# Patient Record
Sex: FEMALE | Race: WHITE | NOT HISPANIC OR LATINO | Employment: FULL TIME | RURAL
[De-identification: names, ages, dates, MRNs, and addresses within clinical notes are randomized per-mention and may not be internally consistent; named-entity substitution may affect disease eponyms.]

---

## 2017-09-21 ENCOUNTER — TRANSCRIBE ORDERS (OUTPATIENT)
Dept: PHYSICAL THERAPY | Facility: HOSPITAL | Age: 42
End: 2017-09-21

## 2017-09-21 DIAGNOSIS — M54.2 NECK PAIN: Primary | ICD-10-CM

## 2017-10-04 ENCOUNTER — HOSPITAL ENCOUNTER (OUTPATIENT)
Dept: PHYSICAL THERAPY | Facility: HOSPITAL | Age: 42
Setting detail: THERAPIES SERIES
Discharge: HOME | End: 2017-10-04

## 2017-10-04 DIAGNOSIS — M54.2 NECK PAIN: Primary | ICD-10-CM

## 2017-10-04 PROCEDURE — 97161 PT EVAL LOW COMPLEX 20 MIN: CPT
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
## 2017-10-11 ENCOUNTER — HOSPITAL ENCOUNTER (OUTPATIENT)
Dept: PHYSICAL THERAPY | Facility: HOSPITAL | Age: 42
Setting detail: THERAPIES SERIES
Discharge: HOME | End: 2017-10-11

## 2017-10-11 DIAGNOSIS — M54.2 NECK PAIN: Primary | ICD-10-CM

## 2017-10-11 PROCEDURE — 97110 THERAPEUTIC EXERCISES: CPT

## 2017-10-11 PROCEDURE — 97140 MANUAL THERAPY 1/> REGIONS: CPT

## 2017-10-11 NOTE — THERAPY TREATMENT NOTE
Outpatient Physical Therapy Ortho Treatment Note  [facility]     Patient Name: Paradise English  : 1975  MRN: [id number]  Today's Date: 10/11/2017      Subjective Improvement:  Attendance:   Approved: 6 approved  MD Follow up:   Date: 10/25/17    Visit Date: 10/11/2017    Visit Dx:    ICD-10-CM ICD-9-CM   1. Neck pain M54.2 723.1       There is no problem list on file for this patient.       Past Medical History:   Diagnosis Date   • Low back pain         Past Surgical History:   Procedure Laterality Date   • HERNIA REPAIR     • LAPAROSCOPIC TUBAL LIGATION     • SPLENECTOMY     • TONSILLECTOMY               PT Ortho       10/11/17 1400    Subjective Comments    Subjective Comments Patient reports she thinks the pain is a little better.   -NH    Precautions and Contraindications    Precautions/Limitations no known precautions/limitations  -NH    Subjective Pain    Able to rate subjective pain? yes  -NH    Pre-Treatment Pain Level 7  -NH      User Key  (r) = Recorded By, (t) = Taken By, (c) = Cosigned By    Initials Name Provider Type    NH Rupali Byers, PT Physical Therapist                            PT Assessment/Plan       10/11/17 1400       PT Assessment    Functional Limitations Limitation in home management;Limitations in community activities;Limitations in functional capacity and performance;Performance in work activities;Performance in self-care ADL  -NH     Impairments Impaired flexibility;Joint mobility;Muscle strength;Pain;Poor body mechanics;Posture;Range of motion  -NH     Assessment Comments Patient has fair tolerance for manual with cues to reduce guarding and holding head up. Patient's reported symptoms do not match mnaual findings. Extreme tenderness thorughout occipitals and cervical paraspinals.   -NH     Rehab Potential Good  -NH     Patient/caregiver participated in establishment of treatment plan and goals Yes  -NH     Patient would benefit from skilled  therapy intervention Yes  -NH     PT Plan    PT Frequency 2x/week  -NH     Predicted Duration of Therapy Intervention (days/wks) 6-8 weeks  -NH     PT Plan Comments Advance postural stability as able including neutral spine training and cervical isometrics.   -NH       User Key  (r) = Recorded By, (t) = Taken By, (c) = Cosigned By    Initials Name Provider Type    NH Rupali Byers, PT Physical Therapist                Modalities       10/11/17 1400          Ice    Ice Applied Yes  -NH      Location C-spine  -NH      Rx Minutes 15 mins  -NH      Ice S/P Rx Yes  -NH        User Key  (r) = Recorded By, (t) = Taken By, (c) = Cosigned By    Initials Name Provider Type    NH Rupali Byers, PT Physical Therapist                Exercises       10/11/17 1400          Subjective Comments    Subjective Comments Patient reports she thinks the pain is a little better.   -NH      Subjective Pain    Able to rate subjective pain? yes  -NH      Pre-Treatment Pain Level 7  -NH      Exercise 1    Exercise Name 1 chin tuck  -NH      Exercise 2    Exercise Name 2 shoulder circles  -NH      Exercise 3    Exercise Name 3 scap retraction  -NH      Exercise 4    Exercise Name 4 UT stretch  -NH      Sets 4 3  -NH      Time (Seconds) 4 20  -NH      Additional Comments gentle  -NH      Exercise 5    Exercise Name 5 LS stretch  -NH      Sets 5 3  -NH      Time (Seconds) 5 20  -NH      Additional Comments gentle  -NH        User Key  (r) = Recorded By, (t) = Taken By, (c) = Cosigned By    Initials Name Provider Type    NH Rupali Byers, PT Physical Therapist                        Manual Rx (last 36 hours)      Manual Treatments       10/11/17 1500          Manual Rx 1    Manual Rx 1 Location Cervical  -NH      Manual Rx 1 Type STM/gentle distraction  -NH      Manual Rx 1 Duration 23'  -NH        User Key  (r) = Recorded By, (t) = Taken By, (c) = Cosigned By    Initials Name Provider Type    NH Rupali Byers, PT Physical Therapist                 PT OP Goals       10/11/17 1400       PT Short Term Goals    STG Date to Achieve 10/25/17  -NH     STG 1 IND and compliant with HEP  -NH     STG 2 Patient will increase cervical flexion to 30 degrees  -NH     STG 3 Patient will increase cervical extension to 25 degrees  -NH     STG 4 Patient will report 50% decrease in HA   -NH     STG 5 Patient will decrese NDI to less than 30%  -NH     Long Term Goals    LTG Date to Achieve 11/15/17  -NH     LTG 1 Patient will be able to tolerate 45 minute treatment session with no greater than 2 point increase in pain on VAS  -NH     LTG 2 Patient will reduce NDI to less than 20%  -NH     LTG 3 Patient will have cervical extension to 35 degrees  -NH     LTG 4 Patient will demonstrate cervical rotation to 50 degrees bilaterally  -NH     LTG 5 Patient will demonstrate 3+/5 B shoulder flexion strength  -NH     LTG 6 Patient will demonstrate 3+/5 B shoudler abd strength  -NH     Time Calculation    PT Goal Re-Cert Due Date 10/25/17  -NH       User Key  (r) = Recorded By, (t) = Taken By, (c) = Cosigned By    Initials Name Provider Type    NH Rupali Byers, PT Physical Therapist                Therapy Education       10/11/17 1400          Therapy Education    Given HEP;Symptoms/condition management;Pain management  -NH      Program Reinforced  -NH      How Provided Verbal;Demonstration;Written  -NH      Provided to Patient  -NH      Level of Understanding Verbalized;Demonstrated  -NH        User Key  (r) = Recorded By, (t) = Taken By, (c) = Cosigned By    Initials Name Provider Type    NH Rupali Byers, PT Physical Therapist                Time Calculation:   Start Time: 1437  Stop Time: 1533  Time Calculation (min): 56 min  Total Timed Code Minutes- PT: 41 minute(s)    Therapy Charges for Today     Code Description Service Date Service Provider Modifiers Qty    32854922200 HC PT MANUAL THERAPY EA 15 MIN 10/11/2017 Rupali Byers, PT GP 2    92227106031 HC PT THER  PROC EA 15 MIN 10/11/2017 Rupali Byers, PT GP 1    [id number]  PT THER SUPP EA 15 MIN 10/11/2017 Rupali Byers, PT GP 1                    Rupali Byers, PT  10/11/2017

## 2017-10-13 ENCOUNTER — HOSPITAL ENCOUNTER (OUTPATIENT)
Dept: PHYSICAL THERAPY | Facility: HOSPITAL | Age: 42
Setting detail: THERAPIES SERIES
End: 2017-10-13

## 2017-10-16 ENCOUNTER — HOSPITAL ENCOUNTER (OUTPATIENT)
Dept: PHYSICAL THERAPY | Facility: HOSPITAL | Age: 42
Setting detail: THERAPIES SERIES
Discharge: HOME | End: 2017-10-16

## 2017-10-16 DIAGNOSIS — M54.2 NECK PAIN: Primary | ICD-10-CM

## 2017-10-16 PROCEDURE — 97110 THERAPEUTIC EXERCISES: CPT

## 2017-10-16 PROCEDURE — 97140 MANUAL THERAPY 1/> REGIONS: CPT

## 2017-10-16 NOTE — THERAPY TREATMENT NOTE
"    Outpatient Physical Therapy Ortho Treatment Note  [facility]  Yue Canela PTA  10/16/17  10:29 AM       Patient Name: Paradise English  : 1975  MRN: [id number]  Today's Date: 10/16/2017      Visit Date: 10/16/2017    Subjective Improvement: \"a little\"       Attendance: 3/4   Approved: eval + 6           MD follow up: TBD           RC date: 10/25/17         Visit Dx:    ICD-10-CM ICD-9-CM   1. Neck pain M54.2 723.1       There is no problem list on file for this patient.       Past Medical History:   Diagnosis Date   • Low back pain         Past Surgical History:   Procedure Laterality Date   • HERNIA REPAIR     • LAPAROSCOPIC TUBAL LIGATION     • SPLENECTOMY     • TONSILLECTOMY               PT Ortho       10/16/17 0900    Precautions and Contraindications    Precautions/Limitations no known precautions/limitations  -    Posture/Observations    Posture/Observations Comments Guarded posture. Fwd rounded shoulders  -      User Key  (r) = Recorded By, (t) = Taken By, (c) = Cosigned By    Initials Name Provider Type     Yue Canela PTA Physical Therapy Assistant                            PT Assessment/Plan       10/16/17 0900       PT Assessment    Functional Limitations Limitation in home management;Limitations in community activities;Limitations in functional capacity and performance;Performance in work activities;Performance in self-care ADL  -     Impairments Impaired flexibility;Joint mobility;Muscle strength;Pain;Poor body mechanics;Posture;Range of motion  -     Assessment Comments pt very TTP to occiptials and cspine paraspinals. Able to tolerate STM/MFR to B UT better than cspine. Needed cues to try to relax neck and shoulders during therex due to pt being in a gaurded postion most of the time.  -     Rehab Potential Good  -     Patient/caregiver participated in establishment of treatment plan and goals Yes  -     Patient would benefit from skilled " therapy intervention Yes  -LASHON     PT Plan    PT Frequency 2x/week  -LASHON     Predicted Duration of Therapy Intervention (days/wks) 6-8 weeks  -LASHON     PT Plan Comments Try IFC next visit. Possible cspine isometrics  -LASHON       User Key  (r) = Recorded By, (t) = Taken By, (c) = Cosigned By    Initials Name Provider Type    LASHON Canela PTA Physical Therapy Assistant                Modalities       10/16/17 0900          Moist Heat    MH Applied Yes  -LASHON      Location Cspine  -LASHON      Rx Minutes 15 mins  -LASHON      MH S/P Rx Yes  -LASHON        User Key  (r) = Recorded By, (t) = Taken By, (c) = Cosigned By    Initials Name Provider Type    LASHON LopesYue G LIZA Canela Physical Therapy Assistant                Exercises       10/16/17 0900          Subjective Comments    Subjective Comments pt reports that she gets some releif from laying down but seems to have a constant HA.  -LASHON      Subjective Pain    Able to rate subjective pain? yes  -LASHON      Pre-Treatment Pain Level 8  -LASHON      Post-Treatment Pain Level 6  -LASHON      Aquatics    Aquatics performed? No  -LASHON      Exercise 1    Exercise Name 1 UT S  -LASHON      Reps 1 2  -LASHON      Time (Seconds) 1 30 sec hold  -LASHON      Exercise 2    Exercise Name 2 Levator S  -LASHON      Reps 2 2  -LASHON      Time (Seconds) 2 30 sec   -LASHON      Exercise 3    Exercise Name 3 Scap squeezes  -LASHON      Sets 3 2  -LASHON      Reps 3 10  -LASHON      Exercise 4    Exercise Name 4 Shoulder rolls bkw  -LASHON      Sets 4 2  -LASHON      Reps 4 10  -LASHON      Exercise 5    Exercise Name 5 No moneys  -LASHON      Sets 5 2  -LASHON      Reps 5 10  -LASHON      Time (Seconds) 5 5 sec hold  -LASHON      Exercise 6    Exercise Name 6 Supine chin tucks  -LASHON      Sets 6 2  -LASHON      Reps 6 10  -LASHON      Time (Seconds) 6 5 sec hold  -LASHON      Exercise 7    Exercise Name 7 Supine cspine AROM rot/ SB  -LASHON      Sets 7 2  -LASHON      Reps 7 10  -LASHON        User Key  (r) = Recorded By, (t) = Taken By, (c) = Cosigned By    Initials Name Provider Type    LASHON  Yue Canela PTA Physical Therapy Assistant                        Manual Rx (last 36 hours)      Manual Treatments       10/16/17 0900          Manual Rx 1    Manual Rx 1 Location Cervical  -LASHON      Manual Rx 1 Type STM/gentle distraction  -LASHON      Manual Rx 1 Duration 15 min  -LASHON        User Key  (r) = Recorded By, (t) = Taken By, (c) = Cosigned By    Initials Name Provider Type    LASHON Canela PTA Physical Therapy Assistant                PT OP Goals       10/16/17 0900       PT Short Term Goals    STG Date to Achieve 10/25/17  -LASHON     STG 1 IND and compliant with HEP  -LASHON     STG 2 Patient will increase cervical flexion to 30 degrees  -LASHON     STG 3 Patient will increase cervical extension to 25 degrees  -LASHON     STG 4 Patient will report 50% decrease in HA   -LASHON     STG 5 Patient will decrese NDI to less than 30%  -     Long Term Goals    LTG Date to Achieve 11/15/17  -LASHON     LTG 1 Patient will be able to tolerate 45 minute treatment session with no greater than 2 point increase in pain on VAS  -     LTG 2 Patient will reduce NDI to less than 20%  -LASHON     LTG 3 Patient will have cervical extension to 35 degrees  -LASHON     LTG 4 Patient will demonstrate cervical rotation to 50 degrees bilaterally  -LASHON     LTG 5 Patient will demonstrate 3+/5 B shoulder flexion strength  -LASHON     LTG 6 Patient will demonstrate 3+/5 B shoudler abd strength  -     Time Calculation    PT Goal Re-Cert Due Date 10/25/17  -LASHON       User Key  (r) = Recorded By, (t) = Taken By, (c) = Cosigned By    Initials Name Provider Type    LASHON Canela PTA Physical Therapy Assistant                Therapy Education       10/16/17 0900          Therapy Education    Given HEP;Symptoms/condition management;Pain management  -LASHON      Program Reinforced  -LASHON      How Provided Verbal;Demonstration;Written  -LASHON      Provided to Patient  -LASHON      Level of Understanding Verbalized;Demonstrated  -LASHON        User Key  (r) = Recorded  By, (t) = Taken By, (c) = Cosigned By    Initials Name Provider Type    LASHON Canela PTA Physical Therapy Assistant                Time Calculation:   Start Time: 0930  Stop Time: 1027  Time Calculation (min): 57 min  Total Timed Code Minutes- PT: 42 minute(s)    Therapy Charges for Today     Code Description Service Date Service Provider Modifiers Qty    75667825905 HC PT THER PROC EA 15 MIN 10/16/2017 Yue Canela PTA GP 2    95461874933 HC PT MANUAL THERAPY EA 15 MIN 10/16/2017 Yue Canela, LIZA GP 1    19519243772 HC PT THER SUPP EA 15 MIN 10/16/2017 Yue Canela, LIZA GP 1                    Yue Canela PTA  10/16/2017

## 2017-10-19 ENCOUNTER — HOSPITAL ENCOUNTER (OUTPATIENT)
Dept: PHYSICAL THERAPY | Facility: HOSPITAL | Age: 42
Setting detail: THERAPIES SERIES
Discharge: HOME | End: 2017-10-19

## 2017-10-19 DIAGNOSIS — M54.2 NECK PAIN: Primary | ICD-10-CM

## 2017-10-19 PROCEDURE — 97110 THERAPEUTIC EXERCISES: CPT

## 2017-10-19 NOTE — THERAPY TREATMENT NOTE
Outpatient Physical Therapy Ortho Treatment Note  [facility]  Chelsea Giron PTA  10/19/17  1:36 PM       Patient Name: Paradise English  : 1975  MRN: [id number]  Today's Date: 10/19/2017      Visit Date: 10/19/2017  Subjective Improvement:    10%   Attendance:   Approved:     eval + 6 10-5/11-4      MD follow up:  TBS ? Monday         RC date:     10/25/2017    Visit Dx:    ICD-10-CM ICD-9-CM   1. Neck pain M54.2 723.1       There is no problem list on file for this patient.       Past Medical History:   Diagnosis Date   • Low back pain         Past Surgical History:   Procedure Laterality Date   • HERNIA REPAIR     • LAPAROSCOPIC TUBAL LIGATION     • SPLENECTOMY     • TONSILLECTOMY               PT Ortho       10/19/17 0800    Subjective Pain    Pre-Treatment Pain Level 8  -PM    Post-Treatment Pain Level 5  -PM      User Key  (r) = Recorded By, (t) = Taken By, (c) = Cosigned By    Initials Name Provider Type    PM Chelsea Giron PTA Physical Therapy Assistant                            PT Assessment/Plan       10/19/17 0900       PT Assessment    Assessment Comments Pt cooperative with PT; guarded with movements; cues needed for excs and posture. Good response today to Rx incl IFC and very gentle manual (less is more for her). Decr pain post treatment.  -PM     Rehab Potential Good  -PM     Patient/caregiver participated in establishment of treatment plan and goals Yes  -PM     Patient would benefit from skilled therapy intervention Yes  -PM     PT Plan    PT Frequency 2x/week  -PM     Predicted Duration of Therapy Intervention (days/wks) 6-8 wks  -PM     PT Plan Comments deep cervical flexors gentle isometric to PTA, supine  -PM       User Key  (r) = Recorded By, (t) = Taken By, (c) = Cosigned By    Initials Name Provider Type    CLARENCE Giron PTA Physical Therapy Assistant                Modalities       10/19/17 0800          Moist Heat    Location Cspine  -PM       Rx Minutes 15 mins  -PM      MH S/P Rx Yes  -PM      ELECTRICAL STIMULATION    Attended/Unattended Unattended  -PM      Stimulation Type IFC  -PM      Location/Electrode Placement/Other cervical; UT  -PM      Rx Minutes 15 mins  -PM        User Key  (r) = Recorded By, (t) = Taken By, (c) = Cosigned By    Initials Name Provider Type    PM Chelsea Giron PTA Physical Therapy Assistant                Exercises       10/19/17 0800          Subjective Comments    Subjective Comments PT helped a little but then migraine HA rest of day. As RE: HEP, shoulder blade squeezes worst b/c goes up neck.  -PM      Subjective Pain    Pre-Treatment Pain Level 8  -PM      Post-Treatment Pain Level 5  -PM      Aquatics    Aquatics performed? No  -PM      Exercise 1    Exercise Name 1 UT S  -PM      Cueing 1 Verbal  -PM      Reps 1 3  -PM      Time (Seconds) 1 20  -PM      Exercise 2    Exercise Name 2 Levator S  -PM      Cueing 2 Verbal  -PM      Reps 2 3  -PM      Time (Seconds) 2 20  -PM      Exercise 3    Exercise Name 3 low trap iso row  -PM      Cueing 3 Tactile  -PM      Sets 3 1  -PM      Reps 3 10  -PM      Time (Seconds) 3 3  -PM      Exercise 4    Exercise Name 4 Shoulder rolls bkw  -PM      Cueing 4 Verbal  -PM      Sets 4 2  -PM      Reps 4 10  -PM      Exercise 5    Exercise Name 5 No moneys  -PM      Cueing 5 Verbal  -PM      Sets 5 2  -PM      Reps 5 8  -PM      Time (Seconds) 5 3  -PM      Exercise 6    Exercise Name 6 Supine chin tucks  -PM      Cueing 6 Tactile  -PM      Sets 6 2  -PM      Reps 6 10  -PM      Time (Seconds) 6 5 sec hold  -PM      Exercise 7    Exercise Name 7 Supine cspine AROM rot/ SB  -PM      Sets 7 1  -PM      Reps 7 15  -PM      Exercise 8    Exercise Name 8 Doorway S   -PM      Cueing 8 Demo  -PM      Reps 8 3  -PM      Time (Seconds) 8 20  -PM        User Key  (r) = Recorded By, (t) = Taken By, (c) = Cosigned By    Initials Name Provider Type    PM Chelsea Giron PTA Physical  Therapy Assistant                        Manual Rx (last 36 hours)      Manual Treatments       10/19/17 0800          Manual Rx 1    Manual Rx 1 Location Cervical sub occip; paraspinal; SCM; UT; LS  -PM      Manual Rx 1 Type gentle STM/MFR; sub occip release very gentle  -PM      Manual Rx 1 Duration 15 min  -PM        User Key  (r) = Recorded By, (t) = Taken By, (c) = Cosigned By    Initials Name Provider Type    PM Chelsea Giron PTA Physical Therapy Assistant                PT OP Goals       10/19/17 0800       PT Short Term Goals    STG Date to Achieve 10/25/17  -PM     STG 1 IND and compliant with HEP  -PM     STG 1 Progress Progressing  -PM     STG 2 Patient will increase cervical flexion to 30 degrees  -PM     STG 2 Progress Ongoing  -PM     STG 3 Patient will increase cervical extension to 25 degrees  -PM     STG 3 Progress Ongoing  -PM     STG 4 Patient will report 50% decrease in HA   -PM     STG 4 Progress Ongoing  -PM     STG 5 Patient will decrese NDI to less than 30%  -PM     STG 5 Progress Ongoing  -PM     Long Term Goals    LTG Date to Achieve 11/15/17  -PM     LTG 1 Patient will be able to tolerate 45 minute treatment session with no greater than 2 point increase in pain on VAS  -PM     LTG 2 Patient will reduce NDI to less than 20%  -PM     LTG 3 Patient will have cervical extension to 35 degrees  -PM     LTG 4 Patient will demonstrate cervical rotation to 50 degrees bilaterally  -PM     LTG 5 Patient will demonstrate 3+/5 B shoulder flexion strength  -PM     LTG 6 Patient will demonstrate 3+/5 B shoudler abd strength  -PM     Time Calculation    PT Goal Re-Cert Due Date 10/25/17  -PM       User Key  (r) = Recorded By, (t) = Taken By, (c) = Cosigned By    Initials Name Provider Type    PM Chelsea Giron PTA Physical Therapy Assistant                Therapy Education       10/19/17 0800          Therapy Education    Given HEP;Symptoms/condition management;Pain management  -PM      Program  Reinforced  -PM      How Provided Verbal;Demonstration;Written  -PM      Provided to Patient  -PM      Level of Understanding Verbalized;Demonstrated  -PM        User Key  (r) = Recorded By, (t) = Taken By, (c) = Cosigned By    Initials Name Provider Type    PM Chelsea Giron PTA Physical Therapy Assistant                Time Calculation:   Start Time: 0850  Stop Time: 0945  Time Calculation (min): 55 min  Total Timed Code Minutes- PT: 40 minute(s)    Therapy Charges for Today     Code Description Service Date Service Provider Modifiers Qty    95549952601 HC PT THER PROC EA 15 MIN 10/19/2017 Chelsea Giron PTA GP 3                    Chelsea Giron PTA  10/19/2017

## 2017-10-23 ENCOUNTER — HOSPITAL ENCOUNTER (OUTPATIENT)
Dept: PHYSICAL THERAPY | Facility: HOSPITAL | Age: 42
Setting detail: THERAPIES SERIES
Discharge: HOME | End: 2017-10-23

## 2017-10-23 DIAGNOSIS — M54.2 NECK PAIN: Primary | ICD-10-CM

## 2017-10-23 PROCEDURE — G0283 ELEC STIM OTHER THAN WOUND: HCPCS

## 2017-10-23 PROCEDURE — 97110 THERAPEUTIC EXERCISES: CPT

## 2017-10-23 PROCEDURE — 97140 MANUAL THERAPY 1/> REGIONS: CPT

## 2017-10-23 NOTE — THERAPY TREATMENT NOTE
"    Outpatient Physical Therapy Ortho Treatment Note  [facility]  Yue Canela PTA  10/23/17  11:52 AM       Patient Name: Paradise English  : 1975  MRN: [id number]  Today's Date: 10/23/2017      Visit Date: 10/23/2017    Subjective Improvement: 10%      Attendance:    Approved:eval + 6           MD follow up: 10/23/17           RC date: 10/25/17         Visit Dx:    ICD-10-CM ICD-9-CM   1. Neck pain M54.2 723.1       There is no problem list on file for this patient.       Past Medical History:   Diagnosis Date   • Low back pain         Past Surgical History:   Procedure Laterality Date   • HERNIA REPAIR     • LAPAROSCOPIC TUBAL LIGATION     • SPLENECTOMY     • TONSILLECTOMY               PT Ortho       10/23/17 1100    Precautions and Contraindications    Precautions/Limitations no known precautions/limitations  -LASHON    Subjective Pain    Able to rate subjective pain? yes  -LASHON    Pre-Treatment Pain Level 8  -LASHON    Post-Treatment Pain Level --   \"better\"  -LASHON    Neck    Flexion AROM Deficit 19  -LASHON    Extension AROM Deficit 11  -LASHON    Lt Lat Flexion AROM Deficit 22  -LASHON    Rt Lateral Flexion AROM Deficit 22  -LASHON    Lt Rotation AROM Deficit 30  -LASHON    Rt Rotation AROM Deficit 33  -LASHON      User Key  (r) = Recorded By, (t) = Taken By, (c) = Cosigned By    Initials Name Provider Type    LASHON Canela PTA Physical Therapy Assistant                            PT Assessment/Plan       10/23/17 1100       PT Assessment    Functional Limitations Limitation in home management;Limitations in community activities;Limitations in functional capacity and performance;Performance in work activities;Performance in self-care ADL  -     Impairments Impaired flexibility;Joint mobility;Muscle strength;Pain;Poor body mechanics;Posture;Range of motion  -     Assessment Comments Pt seemed to be a little more relaxed and less garuded with therex today. Decrease amount on manual time due to " "having to get measurements for an MD note.  -LASHON     Rehab Potential Good  -LASHON     Patient/caregiver participated in establishment of treatment plan and goals Yes  -LASHON     Patient would benefit from skilled therapy intervention Yes  -LASHON     PT Plan    PT Frequency 2x/week  -LASHON     Predicted Duration of Therapy Intervention (days/wks) 6-8 weeks  -LASHON     PT Plan Comments MD note sent. Gentle cspine isometrics in supine to PTA  -       User Key  (r) = Recorded By, (t) = Taken By, (c) = Cosigned By    Initials Name Provider Type    LASHON Canela PTA Physical Therapy Assistant                Modalities       10/23/17 1100          Moist Heat    MH Applied Yes  -LASHON      Location Cspine  -LASHON      Rx Minutes 15 mins  -LASHON      MH S/P Rx Yes  -LASHON      ELECTRICAL STIMULATION    Attended/Unattended Unattended  -LASHON      Stimulation Type IFC  -LASHON      Location/Electrode Placement/Other cervical; UT  -LASHON      Rx Minutes 15 mins  -LASHON        User Key  (r) = Recorded By, (t) = Taken By, (c) = Cosigned By    Initials Name Provider Type    LASHON Canela PTA Physical Therapy Assistant                Exercises       10/23/17 1100          Subjective Comments    Subjective Comments pt states that she feels that the R side has loosened up some but the L side still sends shooting pains into her head and continues to have a HA.  -LASHON      Subjective Pain    Able to rate subjective pain? yes  -LASHON      Pre-Treatment Pain Level 8  -LASHON      Post-Treatment Pain Level --   \"better\"  -LASHON      Aquatics    Aquatics performed? No  -LASHON      Exercise 1    Exercise Name 1 UT S   -LASHON      Reps 1 2  -LASHON      Time (Seconds) 1 30 sec hold  -LASHON      Exercise 2    Exercise Name 2 Levator S  -LASHON      Reps 2 2  -LASHON      Time (Seconds) 2 30 sec hold  -LASHON      Exercise 3    Exercise Name 3 Shoulder rolls bkw  -LASHON      Sets 3 2  -LASHON      Reps 3 10  -LASHON      Exercise 4    Exercise Name 4 No moneys  -LASHON      Sets 4 2  -LASHON      Reps 4 10  -LASHON      " Exercise 5    Exercise Name 5 Supine cspine rot  -LASHON      Sets 5 2  -LASHON      Reps 5 10  -LASHON      Time (Seconds) 5 5 sec hold  -LASHON      Exercise 6    Exercise Name 6 Supine chin tucks  -LASHON      Sets 6 2  -LASHON      Reps 6 10  -LASHON      Time (Seconds) 6 5 sec hold  -LASHON      Exercise 7    Exercise Name 7 Doorway S  -LASHON      Reps 7 3  -LASHON      Time (Seconds) 7 30 sec hold  -LASHON        User Key  (r) = Recorded By, (t) = Taken By, (c) = Cosigned By    Initials Name Provider Type    LASHON Canela PTA Physical Therapy Assistant                        Manual Rx (last 36 hours)      Manual Treatments       10/23/17 1100          Manual Rx 1    Manual Rx 1 Location Cervical sub occip; paraspinal; SCM; UT; LS  -LASHON      Manual Rx 1 Type gentle STM/MFR; sub occip release very gentle  -LASHON      Manual Rx 1 Duration 8 min  -LASHON        User Key  (r) = Recorded By, (t) = Taken By, (c) = Cosigned By    Initials Name Provider Type    LASHON Canela PTA Physical Therapy Assistant                PT OP Goals       10/23/17 1100       PT Short Term Goals    STG Date to Achieve 10/25/17  -     STG 1 IND and compliant with HEP  -     STG 1 Progress Progressing  -     STG 2 Patient will increase cervical flexion to 30 degrees  -     STG 2 Progress Ongoing  -     STG 3 Patient will increase cervical extension to 25 degrees  -     STG 3 Progress Ongoing  -     STG 4 Patient will report 50% decrease in HA   -     STG 4 Progress Ongoing  -     STG 5 Patient will decrese NDI to less than 30%  -     STG 5 Progress Ongoing  -     Long Term Goals    LTG Date to Achieve 11/15/17  -     LTG 1 Patient will be able to tolerate 45 minute treatment session with no greater than 2 point increase in pain on VAS  -     LTG 2 Patient will reduce NDI to less than 20%  -     LTG 3 Patient will have cervical extension to 35 degrees  -     LTG 4 Patient will demonstrate cervical rotation to 50 degrees bilaterally  -      LTG 5 Patient will demonstrate 3+/5 B shoulder flexion strength  -LASHON     LTG 6 Patient will demonstrate 3+/5 B shoudler abd strength  -LASHON     Time Calculation    PT Goal Re-Cert Due Date 10/25/17  -LASHON       User Key  (r) = Recorded By, (t) = Taken By, (c) = Cosigned By    Initials Name Provider Type    LASHON Canela PTA Physical Therapy Assistant                Therapy Education       10/23/17 1100          Therapy Education    Education Details Doorway S  -LASHON      Given HEP;Symptoms/condition management;Pain management  -LASHON      Program Reinforced  -LASHON      How Provided Verbal;Demonstration;Written  -LASHON      Provided to Patient  -LASHON      Level of Understanding Verbalized;Demonstrated  -LASHON        User Key  (r) = Recorded By, (t) = Taken By, (c) = Cosigned By    Initials Name Provider Type    LASHON Canela PTA Physical Therapy Assistant                Time Calculation:   Start Time: 1104  Stop Time: 1200  Time Calculation (min): 56 min  Total Timed Code Minutes- PT: 41 minute(s)    Therapy Charges for Today     Code Description Service Date Service Provider Modifiers Qty    74806874130 HC PT THER PROC EA 15 MIN 10/23/2017 Yue Canela PTA GP 2    27484065394 HC PT MANUAL THERAPY EA 15 MIN 10/23/2017 Yue Canela PTA GP 1    19238978685 HC PT ELECTRICAL STIM UNATTENDED 10/23/2017 Yue Canela PTA  1    67717319066 HC PT THER SUPP EA 15 MIN 10/23/2017 Yue Canela PTA GP 1                    Yue Canela PTA  10/23/2017

## 2017-10-26 ENCOUNTER — APPOINTMENT (OUTPATIENT)
Dept: PHYSICAL THERAPY | Facility: HOSPITAL | Age: 42
End: 2017-10-26

## 2017-10-31 ENCOUNTER — APPOINTMENT (OUTPATIENT)
Dept: PHYSICAL THERAPY | Facility: HOSPITAL | Age: 42
End: 2017-10-31

## 2017-11-01 ENCOUNTER — HOSPITAL ENCOUNTER (OUTPATIENT)
Dept: PHYSICAL THERAPY | Facility: HOSPITAL | Age: 42
Setting detail: THERAPIES SERIES
Discharge: HOME | End: 2017-11-01

## 2017-11-01 DIAGNOSIS — M54.2 NECK PAIN: Primary | ICD-10-CM

## 2017-11-01 PROCEDURE — 97140 MANUAL THERAPY 1/> REGIONS: CPT

## 2017-11-01 PROCEDURE — G0283 ELEC STIM OTHER THAN WOUND: HCPCS

## 2017-11-01 PROCEDURE — 97110 THERAPEUTIC EXERCISES: CPT

## 2017-11-01 NOTE — THERAPY PROGRESS REPORT/RE-CERT
Outpatient Physical Therapy Ortho Progress Note  [facility]     Patient Name: Paradise English  : 1975  MRN: [id number]  Today's Date: 2017      Subjective Improvement: 10%      Attendance: 6  Approved:eval + 6 ; asking for more auth          MD follow up: 10/23/17            date: 17     Visit Date: 2017    There is no problem list on file for this patient.       Past Medical History:   Diagnosis Date   • Low back pain         Past Surgical History:   Procedure Laterality Date   • HERNIA REPAIR     • LAPAROSCOPIC TUBAL LIGATION     • SPLENECTOMY     • TONSILLECTOMY         Visit Dx:     ICD-10-CM ICD-9-CM   1. Neck pain M54.2 723.1                 PT Ortho       17 1000    Subjective Comments    Subjective Comments Patient reports that the HA is still there but she feels like she can move her L side a little better. Patient she missed her MD appointment because she woke up with a virius and has been sick the whole past week.   -NH    Precautions and Contraindications    Precautions/Limitations no known precautions/limitations  -NH    Subjective Pain    Able to rate subjective pain? yes  -NH    Pre-Treatment Pain Level 7  -NH    Posture/Observations    Posture/Observations Comments Guarded posture. L shld elevatd and L cervical side bend in rested, guarded position.   -NH    Neck    Flexion AROM Deficit 20  -NH    Extension AROM Deficit 25  -NH    Lt Lat Flexion AROM Deficit 28  -NH    Rt Lateral Flexion AROM Deficit 40  -NH    Lt Rotation AROM Deficit 55  -NH    Rt Rotation AROM Deficit 50  -NH    Left Shoulder    Flexion AROM Deficit 110  -NH    External Rotation AROM Deficit Occiput functional reach  -NH    Internal Rotation AROM Deficit L5 Functional reach  -NH    MMT (Manual Muscle Testing)    General MMT Assessment Detail 3/5 grossly B UE. Patient unable to tolerate MMT due to increased pain with all motions tested.  -NH    Upper Extremity Flexibility     Suboccipitals Bilateral:;Moderately limited  -NH    Upper Trapezius Bilateral:;Mildly limited  -NH    UE Flexibility Comments Severely TTP. Fair tolerance to STM or pressure to paraspinals  -NH      User Key  (r) = Recorded By, (t) = Taken By, (c) = Cosigned By    Initials Name Provider Type    NH Rupali Byers, PT Physical Therapist                            Therapy Education       11/01/17 1000          Therapy Education    Given HEP;Symptoms/condition management;Pain management  -NH      Program Reinforced  -NH      How Provided Verbal;Demonstration;Written  -NH      Provided to Patient  -NH      Level of Understanding Verbalized;Demonstrated  -NH        User Key  (r) = Recorded By, (t) = Taken By, (c) = Cosigned By    Initials Name Provider Type    NH Rupali Byers, PT Physical Therapist                PT OP Goals       11/01/17 1000       PT Short Term Goals    STG Date to Achieve 10/25/17  -NH     STG 1 IND and compliant with HEP  -NH     STG 1 Progress Progressing  -NH     STG 2 Patient will increase cervical flexion to 30 degrees  -NH     STG 2 Progress Ongoing  -NH     STG 3 Patient will increase cervical extension to 25 degrees  -NH     STG 3 Progress Met  -NH     STG 4 Patient will report 50% decrease in HA   -NH     STG 4 Progress Ongoing  -NH     STG 5 Patient will decrese NDI to less than 30%  -NH     STG 5 Progress Ongoing  -NH     Long Term Goals    LTG Date to Achieve 11/15/17  -NH     LTG 1 Patient will be able to tolerate 45 minute treatment session with no greater than 2 point increase in pain on VAS  -NH     LTG 2 Patient will reduce NDI to less than 20%  -NH     LTG 3 Patient will have cervical extension to 35 degrees  -NH     LTG 4 Patient will demonstrate cervical rotation to 50 degrees bilaterally  -NH     LTG 5 Patient will demonstrate 3+/5 B shoulder flexion strength  -NH     LTG 6 Patient will demonstrate 3+/5 B shoudler abd strength  -NH     Time Calculation    PT Goal Re-Cert Due  Date 11/22/17  -NH       User Key  (r) = Recorded By, (t) = Taken By, (c) = Cosigned By    Initials Name Provider Type    NH Rupali Byers, PT Physical Therapist                PT Assessment/Plan       11/01/17 1000       PT Assessment    Functional Limitations Limitation in home management;Limitations in community activities;Limitations in functional capacity and performance;Performance in work activities;Performance in self-care ADL  -NH     Impairments Impaired flexibility;Joint mobility;Muscle strength;Pain;Poor body mechanics;Posture;Range of motion  -NH     Assessment Comments Patient progressing slowly with physical therapy at this time due to pain levels and limited tolerance for progression of exercises. Patient does demonstrate improvements in ROM as evidenced by measurements and would benefit from continuing skilled physical therapy at this time to improve functional mobility and tolerance to work activities.  -NH     Rehab Potential Good  -NH     Patient/caregiver participated in establishment of treatment plan and goals Yes  -NH     Patient would benefit from skilled therapy intervention Yes  -NH     PT Plan    PT Frequency 2x/week  -NH     Predicted Duration of Therapy Intervention (days/wks) 4 weeks  -NH     PT Plan Comments Continue to progress postural strengthening as tolerated.  -NH       User Key  (r) = Recorded By, (t) = Taken By, (c) = Cosigned By    Initials Name Provider Type    NH Rupali Byers, PT Physical Therapist                Modalities       11/01/17 1000          Moist Heat    Location Cspine  -NH      Rx Minutes 15 mins  -NH      MH S/P Rx Yes  -NH      ELECTRICAL STIMULATION    Attended/Unattended Unattended  -NH      Stimulation Type IFC  -NH      Location/Electrode Placement/Other cervical; UT  -NH      Rx Minutes 15 mins  -NH        User Key  (r) = Recorded By, (t) = Taken By, (c) = Cosigned By    Initials Name Provider Type    NH Rupali Byers, PT Physical Therapist               Exercises       11/01/17 1000          Subjective Comments    Subjective Comments Patient reports that the HA is still there but she feels like she can move her L side a little better. Patient she missed her MD appointment because she woke up with a virius and has been sick the whole past week.   -NH      Subjective Pain    Able to rate subjective pain? yes  -NH      Pre-Treatment Pain Level 7  -NH      Exercise 1    Exercise Name 1 UT S   -NH      Reps 1 2  -NH      Time (Seconds) 1 30 sec hold  -NH      Exercise 2    Exercise Name 2 Levator S  -NH      Reps 2 2  -NH      Time (Seconds) 2 30 sec hold  -NH      Exercise 3    Exercise Name 3 Shoulder rolls bkw  -NH      Sets 3 2  -NH      Reps 3 10  -NH      Exercise 4    Exercise Name 4 No moneys  -NH      Sets 4 2  -NH      Reps 4 10  -NH      Exercise 5    Exercise Name 5 Supine cspine rot  -NH      Sets 5 2  -NH      Reps 5 10  -NH      Time (Seconds) 5 5 sec hold  -NH      Exercise 6    Exercise Name 6 Supine chin tucks  -NH      Sets 6 2  -NH      Reps 6 10  -NH      Time (Seconds) 6 5 sec hold  -NH      Exercise 7    Exercise Name 7 Doorway S  -NH      Reps 7 3  -NH      Time (Seconds) 7 30 sec hold  -NH        User Key  (r) = Recorded By, (t) = Taken By, (c) = Cosigned By    Initials Name Provider Type    NH Rupali Byers, PT Physical Therapist           Manual Rx (last 36 hours)      Manual Treatments       11/01/17 1000          Manual Rx 1    Manual Rx 1 Location Cervical sub occip; paraspinal; SCM; UT; LS  -NH      Manual Rx 1 Type gentle STM/MFR; sub occip release very gentle  -NH      Manual Rx 1 Duration 8 min  -NH        User Key  (r) = Recorded By, (t) = Taken By, (c) = Cosigned By    Initials Name Provider Type    NH Rupali Byers, PT Physical Therapist                            Outcome Measures       11/01/17 1000          Neck Disability Index    Section 1 - Pain Intensity 3  -NH      Section 2 - Personal Care 2  -NH      Section 3  - Lifting 3  -NH      Section 4 - Work 2  -NH      Section 5 - Headaches 5  -NH      Section 6 - Concentration 0  -NH      Section 7 - Sleeping 3  -NH      Section 8 - Driving 2  -NH      Section 9 - Reading 0  -NH      Section 10 - Recreation 1  -NH      Neck Disability Index Score 21  -NH      Functional Assessment    Outcome Measure Options Neck Disability Index (NDI)  -NH        User Key  (r) = Recorded By, (t) = Taken By, (c) = Cosigned By    Initials Name Provider Type    NH Rupali Byers, PT Physical Therapist            Time Calculation:   Start Time: 1001  Stop Time: 1105  Time Calculation (min): 64 min  Total Timed Code Minutes- PT: 49 minute(s)     Therapy Charges for Today     Code Description Service Date Service Provider Modifiers Qty    31697023019 HC PT MANUAL THERAPY EA 15 MIN 11/1/2017 Rupali Byers, PT GP 1    79762906191 HC PT THER PROC EA 15 MIN 11/1/2017 Rupali Byers, PT GP 2    29242601016 HC PT THER SUPP EA 15 MIN 11/1/2017 Rupali Byers, PT GP 1    75854834379 HC PT ELECTRICAL STIM UNATTENDED 11/1/2017 Rupali Byers, PT  1          PT G-Codes  Outcome Measure Options: Neck Disability Index (NDI)         Rupali Byers, PT  11/1/2017

## 2017-11-08 ENCOUNTER — APPOINTMENT (OUTPATIENT)
Dept: PHYSICAL THERAPY | Facility: HOSPITAL | Age: 42
End: 2017-11-08

## 2017-11-17 ENCOUNTER — APPOINTMENT (OUTPATIENT)
Dept: PHYSICAL THERAPY | Facility: HOSPITAL | Age: 42
End: 2017-11-17

## 2017-11-22 ENCOUNTER — APPOINTMENT (OUTPATIENT)
Dept: PHYSICAL THERAPY | Facility: HOSPITAL | Age: 42
End: 2017-11-22

## 2019-09-15 ENCOUNTER — HOSPITAL ENCOUNTER (OUTPATIENT)
Dept: GENERAL RADIOLOGY | Age: 44
Discharge: HOME | End: 2019-09-15
Payer: MEDICAID

## 2019-09-15 ENCOUNTER — HOSPITAL ENCOUNTER (EMERGENCY)
Age: 44
Discharge: HOME | End: 2019-09-15
Payer: MEDICAID

## 2019-09-15 DIAGNOSIS — J18.9 PNEUMONIA DUE TO INFECTIOUS ORGANISM, UNSPECIFIED LATERALITY, UNSPECIFIED PART OF LUNG: ICD-10-CM

## 2019-09-15 LAB
ALBUMIN SERPL-MCNC: 3.9 G/DL (ref 3.5–5.2)
ALP BLD-CCNC: 89 U/L (ref 35–104)
ALT SERPL-CCNC: 13 U/L (ref 5–33)
ANION GAP SERPL CALCULATED.3IONS-SCNC: 12 MMOL/L (ref 7–19)
AST SERPL-CCNC: 11 U/L (ref 5–32)
BASE EXCESS ARTERIAL: -1.3 MMOL/L (ref -2–2)
BASOPHILS ABSOLUTE: 0.1 K/UL (ref 0–0.2)
BASOPHILS RELATIVE PERCENT: 0.5 % (ref 0–1)
BILIRUB SERPL-MCNC: <0.2 MG/DL (ref 0.2–1.2)
BUN BLDV-MCNC: 11 MG/DL (ref 6–20)
CALCIUM SERPL-MCNC: 9.2 MG/DL (ref 8.6–10)
CARBOXYHEMOGLOBIN ARTERIAL: 4.7 % (ref 0–5)
CHLORIDE BLD-SCNC: 104 MMOL/L (ref 98–111)
CO2: 26 MMOL/L (ref 22–29)
CREAT SERPL-MCNC: 0.8 MG/DL (ref 0.5–0.9)
EOSINOPHILS ABSOLUTE: 0.6 K/UL (ref 0–0.6)
EOSINOPHILS RELATIVE PERCENT: 3.2 % (ref 0–5)
GFR NON-AFRICAN AMERICAN: >60
GLUCOSE BLD-MCNC: 115 MG/DL (ref 74–109)
HCO3 ARTERIAL: 23.7 MMOL/L (ref 22–26)
HCT VFR BLD CALC: 41.6 % (ref 37–47)
HEMOGLOBIN, ART, EXTENDED: 14.3 G/DL (ref 12–16)
HEMOGLOBIN: 13.8 G/DL (ref 12–16)
IMMATURE GRANULOCYTES #: 0.1 K/UL
LACTIC ACID: 2.3 MMOL/L (ref 0.5–1.9)
LYMPHOCYTES ABSOLUTE: 4.5 K/UL (ref 1.1–4.5)
LYMPHOCYTES RELATIVE PERCENT: 25.9 % (ref 20–40)
MCH RBC QN AUTO: 33.5 PG (ref 27–31)
MCHC RBC AUTO-ENTMCNC: 33.2 G/DL (ref 33–37)
MCV RBC AUTO: 101 FL (ref 81–99)
METHEMOGLOBIN ARTERIAL: 1.2 %
MONOCYTES ABSOLUTE: 1.2 K/UL (ref 0–0.9)
MONOCYTES RELATIVE PERCENT: 7.2 % (ref 0–10)
NEUTROPHILS ABSOLUTE: 10.8 K/UL (ref 1.5–7.5)
NEUTROPHILS RELATIVE PERCENT: 62.7 % (ref 50–65)
O2 CONTENT ARTERIAL: 18.3 ML/DL
O2 SAT, ARTERIAL: 91.1 %
O2 THERAPY: ABNORMAL
PCO2 ARTERIAL: 40 MMHG (ref 35–45)
PDW BLD-RTO: 13.2 % (ref 11.5–14.5)
PH ARTERIAL: 7.38 (ref 7.35–7.45)
PLATELET # BLD: 499 K/UL (ref 130–400)
PMV BLD AUTO: 10.8 FL (ref 9.4–12.3)
PO2 ARTERIAL: 73 MMHG (ref 80–100)
POTASSIUM SERPL-SCNC: 4.1 MMOL/L (ref 3.5–5)
POTASSIUM, WHOLE BLOOD: 3.9
PRO-BNP: 72 PG/ML (ref 0–450)
RBC # BLD: 4.12 M/UL (ref 4.2–5.4)
SODIUM BLD-SCNC: 142 MMOL/L (ref 136–145)
TOTAL PROTEIN: 7.5 G/DL (ref 6.6–8.7)
TROPONIN: <0.01 NG/ML (ref 0–0.03)
WBC # BLD: 17.2 K/UL (ref 4.8–10.8)

## 2019-09-15 PROCEDURE — 96374 THER/PROPH/DIAG INJ IV PUSH: CPT

## 2019-09-15 PROCEDURE — 96375 TX/PRO/DX INJ NEW DRUG ADDON: CPT

## 2019-09-15 PROCEDURE — 84132 ASSAY OF SERUM POTASSIUM: CPT

## 2019-09-15 PROCEDURE — 71046 X-RAY EXAM CHEST 2 VIEWS: CPT

## 2019-09-15 PROCEDURE — 36600 WITHDRAWAL OF ARTERIAL BLOOD: CPT

## 2019-09-15 PROCEDURE — 84484 ASSAY OF TROPONIN QUANT: CPT

## 2019-09-15 PROCEDURE — 2580000003 HC RX 258: Performed by: EMERGENCY MEDICINE

## 2019-09-15 PROCEDURE — 87040 BLOOD CULTURE FOR BACTERIA: CPT

## 2019-09-15 PROCEDURE — 6370000000 HC RX 637 (ALT 250 FOR IP): Performed by: EMERGENCY MEDICINE

## 2019-09-15 PROCEDURE — 85025 COMPLETE CBC W/AUTO DIFF WBC: CPT

## 2019-09-15 PROCEDURE — 94640 AIRWAY INHALATION TREATMENT: CPT

## 2019-09-15 PROCEDURE — 83605 ASSAY OF LACTIC ACID: CPT

## 2019-09-15 PROCEDURE — 99285 EMERGENCY DEPT VISIT HI MDM: CPT

## 2019-09-15 PROCEDURE — 6360000002 HC RX W HCPCS: Performed by: EMERGENCY MEDICINE

## 2019-09-15 PROCEDURE — 82803 BLOOD GASES ANY COMBINATION: CPT

## 2019-09-15 PROCEDURE — 36415 COLL VENOUS BLD VENIPUNCTURE: CPT

## 2019-09-15 PROCEDURE — 83880 ASSAY OF NATRIURETIC PEPTIDE: CPT

## 2019-09-15 PROCEDURE — 80053 COMPREHEN METABOLIC PANEL: CPT

## 2019-09-15 RX ORDER — METHYLPREDNISOLONE SODIUM SUCCINATE 125 MG/2ML
125 INJECTION, POWDER, LYOPHILIZED, FOR SOLUTION INTRAMUSCULAR; INTRAVENOUS ONCE
Status: COMPLETED | OUTPATIENT
Start: 2019-09-15 | End: 2019-09-15

## 2019-09-15 RX ORDER — IPRATROPIUM BROMIDE AND ALBUTEROL SULFATE 2.5; .5 MG/3ML; MG/3ML
1 SOLUTION RESPIRATORY (INHALATION)
Status: DISCONTINUED | OUTPATIENT
Start: 2019-09-15 | End: 2019-09-15 | Stop reason: HOSPADM

## 2019-09-15 RX ORDER — IPRATROPIUM BROMIDE AND ALBUTEROL SULFATE 2.5; .5 MG/3ML; MG/3ML
1 SOLUTION RESPIRATORY (INHALATION)
Status: DISCONTINUED | OUTPATIENT
Start: 2019-09-15 | End: 2019-09-15 | Stop reason: SDUPTHER

## 2019-09-15 RX ORDER — IPRATROPIUM BROMIDE AND ALBUTEROL SULFATE 2.5; .5 MG/3ML; MG/3ML
SOLUTION RESPIRATORY (INHALATION)
Status: DISCONTINUED
Start: 2019-09-15 | End: 2019-09-15 | Stop reason: HOSPADM

## 2019-09-15 RX ORDER — METHYLPREDNISOLONE SODIUM SUCCINATE 125 MG/2ML
INJECTION, POWDER, LYOPHILIZED, FOR SOLUTION INTRAMUSCULAR; INTRAVENOUS
Status: DISCONTINUED
Start: 2019-09-15 | End: 2019-09-15 | Stop reason: HOSPADM

## 2019-09-15 RX ORDER — CEFTRIAXONE 1 G/1
INJECTION, POWDER, FOR SOLUTION INTRAMUSCULAR; INTRAVENOUS
Status: DISCONTINUED
Start: 2019-09-15 | End: 2019-09-15 | Stop reason: HOSPADM

## 2019-09-15 RX ADMIN — CEFTRIAXONE 1 G: 1 INJECTION, POWDER, FOR SOLUTION INTRAMUSCULAR; INTRAVENOUS at 03:15

## 2019-09-15 RX ADMIN — IPRATROPIUM BROMIDE AND ALBUTEROL SULFATE 1 AMPULE: .5; 3 SOLUTION RESPIRATORY (INHALATION) at 02:41

## 2019-09-15 RX ADMIN — IPRATROPIUM BROMIDE AND ALBUTEROL SULFATE 1 AMPULE: .5; 3 SOLUTION RESPIRATORY (INHALATION) at 01:22

## 2019-09-15 RX ADMIN — METHYLPREDNISOLONE SODIUM SUCCINATE 125 MG: 125 INJECTION, POWDER, FOR SOLUTION INTRAMUSCULAR; INTRAVENOUS at 01:30

## 2019-09-20 LAB
BLOOD CULTURE, ROUTINE: NORMAL
CULTURE, BLOOD 2: NORMAL

## 2022-07-30 ENCOUNTER — APPOINTMENT (OUTPATIENT)
Dept: GENERAL RADIOLOGY | Facility: HOSPITAL | Age: 47
End: 2022-07-30

## 2022-07-30 ENCOUNTER — HOSPITAL ENCOUNTER (EMERGENCY)
Facility: HOSPITAL | Age: 47
Discharge: HOME | End: 2022-07-30
Admitting: STUDENT IN AN ORGANIZED HEALTH CARE EDUCATION/TRAINING PROGRAM

## 2022-07-30 VITALS
HEIGHT: 68 IN | OXYGEN SATURATION: 95 % | RESPIRATION RATE: 18 BRPM | HEART RATE: 68 BPM | DIASTOLIC BLOOD PRESSURE: 85 MMHG | TEMPERATURE: 98 F | SYSTOLIC BLOOD PRESSURE: 133 MMHG | BODY MASS INDEX: 36.49 KG/M2

## 2022-07-30 DIAGNOSIS — S92.334A CLOSED NONDISPLACED FRACTURE OF THIRD METATARSAL BONE OF RIGHT FOOT, INITIAL ENCOUNTER: ICD-10-CM

## 2022-07-30 DIAGNOSIS — S92.344A CLOSED NONDISPLACED FRACTURE OF FOURTH METATARSAL BONE OF RIGHT FOOT, INITIAL ENCOUNTER: ICD-10-CM

## 2022-07-30 DIAGNOSIS — S92.324A CLOSED NONDISPLACED FRACTURE OF SECOND METATARSAL BONE OF RIGHT FOOT, INITIAL ENCOUNTER: Primary | ICD-10-CM

## 2022-07-30 DIAGNOSIS — S99.911A INJURY OF RIGHT ANKLE, INITIAL ENCOUNTER: ICD-10-CM

## 2022-07-30 PROCEDURE — 99284 EMERGENCY DEPT VISIT MOD MDM: CPT

## 2022-07-30 PROCEDURE — 73610 X-RAY EXAM OF ANKLE: CPT

## 2022-07-30 PROCEDURE — 73630 X-RAY EXAM OF FOOT: CPT

## 2022-07-30 RX ORDER — LURASIDONE HYDROCHLORIDE 120 MG/1
40 TABLET, FILM COATED ORAL DAILY
COMMUNITY

## 2022-07-30 RX ORDER — HYDROCODONE BITARTRATE AND ACETAMINOPHEN 7.5; 325 MG/1; MG/1
1 TABLET ORAL ONCE
Status: COMPLETED | OUTPATIENT
Start: 2022-07-30 | End: 2022-07-30

## 2022-07-30 RX ORDER — HYDROCODONE BITARTRATE AND ACETAMINOPHEN 5; 325 MG/1; MG/1
1 TABLET ORAL EVERY 6 HOURS PRN
Qty: 12 TABLET | Refills: 0 | Status: SHIPPED | OUTPATIENT
Start: 2022-07-30 | End: 2022-08-17

## 2022-07-30 RX ADMIN — HYDROCODONE BITARTRATE AND ACETAMINOPHEN 1 TABLET: 7.5; 325 TABLET ORAL at 17:28

## 2022-08-17 ENCOUNTER — OFFICE VISIT (OUTPATIENT)
Dept: NEUROLOGY | Facility: CLINIC | Age: 47
End: 2022-08-17

## 2022-08-17 VITALS
DIASTOLIC BLOOD PRESSURE: 78 MMHG | BODY MASS INDEX: 34.56 KG/M2 | OXYGEN SATURATION: 97 % | HEART RATE: 81 BPM | WEIGHT: 228 LBS | SYSTOLIC BLOOD PRESSURE: 138 MMHG | HEIGHT: 68 IN

## 2022-08-17 DIAGNOSIS — R27.0 ATAXIA: Primary | ICD-10-CM

## 2022-08-17 DIAGNOSIS — R20.0 NUMBNESS AND TINGLING: ICD-10-CM

## 2022-08-17 DIAGNOSIS — R20.2 NUMBNESS AND TINGLING: ICD-10-CM

## 2022-08-17 PROBLEM — F41.9 ANXIETY DISORDER: Status: ACTIVE | Noted: 2018-09-20

## 2022-08-17 PROCEDURE — 99204 OFFICE O/P NEW MOD 45 MIN: CPT | Performed by: PHYSICIAN ASSISTANT

## 2022-08-17 RX ORDER — OXCARBAZEPINE 150 MG/1
150 TABLET, FILM COATED ORAL 2 TIMES DAILY
COMMUNITY
Start: 2022-07-19

## 2022-08-17 RX ORDER — MELOXICAM 15 MG/1
15 TABLET ORAL DAILY
COMMUNITY
Start: 2022-07-19 | End: 2022-08-17 | Stop reason: SINTOL

## 2022-08-17 RX ORDER — MONTELUKAST SODIUM 10 MG/1
10 TABLET ORAL NIGHTLY
COMMUNITY
Start: 2022-06-23

## 2022-08-17 RX ORDER — IBUPROFEN 800 MG/1
800 TABLET ORAL EVERY 6 HOURS PRN
COMMUNITY
Start: 2022-08-16 | End: 2022-09-19

## 2022-09-07 NOTE — PROGRESS NOTES
[facility] - PODIATRY    Today's Date: 09/09/2022     Patient Name: Paradise English  MRN: [id number]  CSN: [id number]  PCP: Antwon Ram APRN  Referring Provider: Rocky Freeman APRN    SUBJECTIVE     Chief Complaint   Patient presents with   • Establish Care     Antwon Ram APRN pcp08/17/2022 REBECA RING FOR MULTIPLE CLOSED FRACTURES OF METATARSAL BONE OF RIGHT FOOT, - pt states  having pain in right foot, across the toes and on the outside of foot - pt pain 5/10 - pt presents new patient with pain in right foot, xrays done at [facility] and got sent to referring provider     HPI: Paradise English, a 47 y.o.female, comes to clinic as a(n) new patient complaining of foot pain and complaining of fracture of right fot. Patient has h/o ADD, anxiety, bipolar disorder, carpal tunnel, low back pain, tobacco use. Patient presents for evaluation of right foot metatarsal fractures. States that she had an injury approximately 6 weeks ago when she fell after getting up from her daughters couch. Has been in CAM since onset of injury . States that she has quite a bit of pain even in boot when foot is in dependent position. Admits to tobacco use but states that she has cut back from around 1.5 ppd to approximately 3-4 cigarettes daily. Admits pain at 5/10 level and described as aching, nagging and sharp. Relates previous treatment(s) including CAM, limited/non-weight bearing. Denies any constitutional symptoms. No other pedal complaints at this time.    Past Medical History:   Diagnosis Date   • ADD (attention deficit disorder)    • Anxiety    • Bipolar 1 disorder (HCC)    • CTS (carpal tunnel syndrome) 04/19   • Difficulty walking 05/22   • Low back pain    • Migraine 01/12   • Shingles 06/19     Past Surgical History:   Procedure Laterality Date   • HERNIA REPAIR     • LAPAROSCOPIC TUBAL LIGATION     • SPLENECTOMY     • TONSILLECTOMY       Family History   Problem Relation Age of Onset   •  "Stroke Mother    • Stroke Maternal Grandmother      Social History     Socioeconomic History   • Marital status: Single   Tobacco Use   • Smoking status: Current Some Day Smoker     Packs/day: 0.50     Years: 15.00     Pack years: 7.50     Types: Cigarettes   Substance and Sexual Activity   • Alcohol use: Not Currently   • Drug use: No   • Sexual activity: Not Currently     Partners: Male     Allergies   Allergen Reactions   • Penicillins Anaphylaxis and Shortness Of Breath     \"can't breath\"     • Tramadol Rash   • Mobic [Meloxicam] Hives   • Neurontin [Gabapentin]    • Toradol [Ketorolac Tromethamine]    • Coconut Rash   • Tizanidine Rash     Current Outpatient Medications   Medication Sig Dispense Refill   • amitriptyline (ELAVIL) 100 MG tablet Take 200 mg by mouth every night at bedtime.  2   • amphetamine-dextroamphetamine (ADDERALL) 20 MG tablet Take 1 tablet by mouth 3 (Three) Times a Day.  0   • ibuprofen (ADVIL,MOTRIN) 800 MG tablet Take 800 mg by mouth Every 6 (Six) Hours As Needed.     • imipramine (TOFRANIL) 50 MG tablet Take 50 mg by mouth 2 (Two) Times a Day.  2   • Lurasidone HCl (LATUDA) 120 MG tablet tablet Take 120 mg by mouth Daily.     • montelukast (SINGULAIR) 10 MG tablet Take 10 mg by mouth Every Night.     • OXcarbazepine (TRILEPTAL) 150 MG tablet Take 150 mg by mouth 2 (Two) Times a Day.     • ProAir  (90 Base) MCG/ACT inhaler Inhale 1 puff Every 6 (Six) Hours As Needed.       No current facility-administered medications for this visit.     Review of Systems   Constitutional: Negative for chills and fever.   HENT: Negative for congestion.    Respiratory: Negative for shortness of breath.    Cardiovascular: Positive for leg swelling. Negative for chest pain.   Gastrointestinal: Negative for constipation, diarrhea, nausea and vomiting.   Musculoskeletal: Positive for arthralgias and gait problem.        Foot pain   Skin: Negative for wound.   Neurological: Negative for numbness. "       OBJECTIVE     Vitals:    09/09/22 0818   BP: 110/70       PHYSICAL EXAM  GEN:   Accompanied by none.     Foot/Ankle Exam:       General:   Appearance: appears stated age and healthy and obesity    Orientation: AAOx3    Affect: appropriate    Gait: antalgic    Assistance: independent    Shoe Gear:  Casual shoes and CAM boot    VASCULAR      Right Foot Vascularity   Dorsalis pedis:  2+  Posterior tibial:  2+  Skin Temperature: warm    Edema Grading:  Trace and non-pitting (focal to dorsal forefoot)  CFT:  3  Pedal Hair Growth:  Present  Varicosities: none       Left Foot Vascularity   Dorsalis pedis:  2+  Posterior tibial:  2+  Skin Temperature: warm    Edema Grading:  None  CFT:  3  Pedal Hair Growth:  Present  Varicosities: none        NEUROLOGIC     Right Foot Neurologic   Normal sensation    Light touch sensation:  Normal  Vibratory sensation:  Normal  Hot/Cold sensation: normal    Protective Sensation using [place] Monofilament:  10     Left Foot Neurologic   Normal sensation    Light touch sensation:  Normal  Vibratory sensation:  Normal  Hot/cold sensation: normal    Protective Sensation using [place]iti Monofilament:  10     MUSCULOSKELETAL      Right Foot Musculoskeletal   Ecchymosis:  None  Tenderness: metatarsals, 2nd MTPJ, 3rd MTPJ and 4th MTJP    Tenderness comment:  3rd met head  Arch:  Normal  Hallux valgus: No       Left Foot Musculoskeletal   Ecchymosis:  None  Tenderness: none    Arch:  Normal  Hallux valgus: No       MUSCLE STRENGTH     Left Foot Muscle Strength   Foot dorsiflexion:  5  Foot plantar flexion:  5  Foot inversion:  5  Foot eversion:  5     RANGE OF MOTION      Left Foot Range of Motion   Foot and ankle ROM within normal limits       DERMATOLOGIC     Right Foot Dermatologic   Skin: skin intact    Nails: abnormally thick       Left Foot Dermatologic   Skin: skin intact    Nails: abnormally thick        RADIOLOGY/NUCLEAR:  No results found.    LABORATORY/CULTURE  RESULTS:      PATHOLOGY RESULTS:       ASSESSMENT/PLAN     Diagnoses and all orders for this visit:    1. Closed nondisplaced fracture of second metatarsal bone of right foot, initial encounter (Primary)  -     XR Foot 3+ View Right; Future    2. Closed displaced fracture of third metatarsal bone of right foot, initial encounter  -     XR Foot 3+ View Right; Future    3. Closed nondisplaced fracture of fourth metatarsal bone of right foot, initial encounter  -     XR Foot 3+ View Right; Future    4. Foot pain, right    5. Tobacco abuse      Comprehensive lower extremity examination and evaluation was performed.  Discussed findings and treatment plan including risks, benefits, and treatment options with patient in detail. Patient agreed with treatment plan.  Reviewed previous xrays.   Updated xrays needed.   Remain in CAM and suggest additional NWB with crutches.    Tobacco cessation needed.   Ultimately patient may require surgery for bone approximation and alignment pending radiographs.   An After Visit Summary was printed and given to the patient at discharge, including (if requested) any available informative/educational handouts regarding diagnosis, treatment, or medications. All questions were answered to patient/family satisfaction. Should symptoms fail to improve or worsen they agree to call or return to clinic or to go to the Emergency Department. Discussed the importance of following up with any needed screening tests/labs/specialist appointments and any requested follow-up recommended by me today. Importance of maintaining follow-up discussed and patient accepts that missed appointments can delay diagnosis and potentially lead to worsening of conditions.  Return in about 2 weeks (around 9/23/2022) for Recheck, Follow-up with Dr. Forrester., or sooner if acute issues arise.        This document has been electronically signed by Ricci Forrester DPM on September 9, 2022 08:39 CDT

## 2022-09-09 ENCOUNTER — OFFICE VISIT (OUTPATIENT)
Dept: PODIATRY | Facility: CLINIC | Age: 47
End: 2022-09-09

## 2022-09-09 ENCOUNTER — HOSPITAL ENCOUNTER (OUTPATIENT)
Dept: GENERAL RADIOLOGY | Facility: HOSPITAL | Age: 47
Discharge: HOME | End: 2022-09-09
Admitting: PODIATRIST

## 2022-09-09 VITALS
HEIGHT: 68 IN | WEIGHT: 228 LBS | DIASTOLIC BLOOD PRESSURE: 70 MMHG | BODY MASS INDEX: 34.56 KG/M2 | SYSTOLIC BLOOD PRESSURE: 110 MMHG

## 2022-09-09 DIAGNOSIS — S92.331A CLOSED DISPLACED FRACTURE OF THIRD METATARSAL BONE OF RIGHT FOOT, INITIAL ENCOUNTER: ICD-10-CM

## 2022-09-09 DIAGNOSIS — S92.324A CLOSED NONDISPLACED FRACTURE OF SECOND METATARSAL BONE OF RIGHT FOOT, INITIAL ENCOUNTER: Primary | ICD-10-CM

## 2022-09-09 DIAGNOSIS — S92.344A CLOSED NONDISPLACED FRACTURE OF FOURTH METATARSAL BONE OF RIGHT FOOT, INITIAL ENCOUNTER: ICD-10-CM

## 2022-09-09 DIAGNOSIS — M79.671 FOOT PAIN, RIGHT: ICD-10-CM

## 2022-09-09 DIAGNOSIS — S92.324A CLOSED NONDISPLACED FRACTURE OF SECOND METATARSAL BONE OF RIGHT FOOT, INITIAL ENCOUNTER: ICD-10-CM

## 2022-09-09 DIAGNOSIS — Z72.0 TOBACCO ABUSE: ICD-10-CM

## 2022-09-09 PROCEDURE — 99203 OFFICE O/P NEW LOW 30 MIN: CPT | Performed by: PODIATRIST

## 2022-09-09 PROCEDURE — 73630 X-RAY EXAM OF FOOT: CPT

## 2022-09-14 ENCOUNTER — TELEPHONE (OUTPATIENT)
Dept: PODIATRY | Facility: CLINIC | Age: 47
End: 2022-09-14

## 2022-09-14 NOTE — TELEPHONE ENCOUNTER
Called pt and left voice mail regarding xray results  1. Subacute fractures of the second, third and fourth metatarsals.

## 2022-09-22 NOTE — PROGRESS NOTES
"    [facility] - PODIATRY    Today's Date: 09/23/2022     Patient Name: Paradise English  MRN: [id number]  [facility]: [id number]  PCP: Antwon Ram APRN  Referring Provider: No ref. provider found    SUBJECTIVE     Chief Complaint   Patient presents with   • Follow-up     Antwon Ram APRN pcp08/17/2022 Return in 2 week for Recheck - Pt States she is still in pain not as bad.      HPI: Paradise English, a 47 y.o.female, comes to clinic as a(n) established patient for follow-up treatment of right foot fracture. Patient has h/o ADD, anxiety, bipolar disorder, carpal tunnel, low back pain, tobacco use. Patient presents for 2 week follow-up for closed displaced fracture of 2-4th metatarsals of the right foot. States she has been in CAM and has been \"off of it more than I have been on it\". Had updated x-rays. States she has essentially quit smoking and is only getting secondhand exposure. Admits pain at 5/10 level and described as aching, nagging and sharp. Relates previous treatment(s) including CAM, limited/non-weight bearing. Denies any constitutional symptoms. No other pedal complaints at this time.    Past Medical History:   Diagnosis Date   • ADD (attention deficit disorder)    • Anxiety    • Bipolar 1 disorder (HCC)    • CTS (carpal tunnel syndrome) 04/19   • Difficulty walking 05/22   • Low back pain    • Migraine 01/12   • Shingles 06/19     Past Surgical History:   Procedure Laterality Date   • HERNIA REPAIR     • LAPAROSCOPIC TUBAL LIGATION     • SPLENECTOMY     • TONSILLECTOMY       Family History   Problem Relation Age of Onset   • Stroke Mother    • Stroke Maternal Grandmother      Social History     Socioeconomic History   • Marital status: Single   Tobacco Use   • Smoking status: Current Some Day Smoker     Packs/day: 0.50     Years: 15.00     Pack years: 7.50     Types: Cigarettes   Substance and Sexual Activity   • Alcohol use: Not Currently   • Drug use: No   • Sexual activity: Not Currently     " "Partners: Male     Allergies   Allergen Reactions   • Penicillins Anaphylaxis and Shortness Of Breath     \"can't breath\"     • Tramadol Rash   • Mobic [Meloxicam] Hives   • Neurontin [Gabapentin]    • Toradol [Ketorolac Tromethamine]    • Coconut Rash   • Tizanidine Rash     Current Outpatient Medications   Medication Sig Dispense Refill   • amitriptyline (ELAVIL) 100 MG tablet Take 200 mg by mouth every night at bedtime.  2   • amphetamine-dextroamphetamine (ADDERALL) 20 MG tablet Take 1 tablet by mouth 3 (Three) Times a Day.  0   • Diclofenac Sodium (VOLTAREN) 1 % gel gel      • EPINEPHrine (EPIPEN) 0.3 MG/0.3ML solution auto-injector injection      • imipramine (TOFRANIL) 50 MG tablet Take 50 mg by mouth 2 (Two) Times a Day.  2   • Lurasidone HCl (LATUDA) 120 MG tablet tablet Take 120 mg by mouth Daily.     • montelukast (SINGULAIR) 10 MG tablet Take 10 mg by mouth Every Night.     • OXcarbazepine (TRILEPTAL) 150 MG tablet Take 150 mg by mouth 2 (Two) Times a Day.     • ProAir  (90 Base) MCG/ACT inhaler Inhale 1 puff Every 6 (Six) Hours As Needed.     • rizatriptan (MAXALT) 10 MG tablet        No current facility-administered medications for this visit.     Review of Systems   Constitutional: Negative for chills and fever.   HENT: Negative for congestion.    Respiratory: Negative for shortness of breath.    Cardiovascular: Positive for leg swelling. Negative for chest pain.   Gastrointestinal: Negative for constipation, diarrhea, nausea and vomiting.   Musculoskeletal: Positive for arthralgias and gait problem.        Foot pain   Skin: Negative for wound.   Neurological: Negative for numbness.       OBJECTIVE     Vitals:    09/23/22 0941   BP: 128/85   Pulse: 84   SpO2: 98%       PHYSICAL EXAM  GEN:   Accompanied by none.     Foot/Ankle Exam:       General:   Appearance: appears stated age and healthy and obesity    Orientation: AAOx3    Affect: appropriate    Gait: antalgic    Assistance: independent  "   Shoe Gear:  Casual shoes and CAM boot    VASCULAR      Right Foot Vascularity   Dorsalis pedis:  2+  Posterior tibial:  2+  Skin Temperature: warm    Edema Grading:  Trace and non-pitting (focal to dorsal forefoot)  CFT:  3  Pedal Hair Growth:  Present  Varicosities: none       Left Foot Vascularity   Dorsalis pedis:  2+  Posterior tibial:  2+  Skin Temperature: warm    Edema Grading:  None  CFT:  3  Pedal Hair Growth:  Present  Varicosities: none        NEUROLOGIC     Right Foot Neurologic   Normal sensation    Light touch sensation:  Normal  Vibratory sensation:  Normal  Hot/Cold sensation: normal    Protective Sensation using [place] Monofilament:  10     Left Foot Neurologic   Normal sensation    Light touch sensation:  Normal  Vibratory sensation:  Normal  Hot/cold sensation: normal    Protective Sensation using [place] Monofilament:  10     MUSCULOSKELETAL      Right Foot Musculoskeletal   Ecchymosis:  None  Tenderness: metatarsals, 2nd MTPJ, 3rd MTPJ and 4th MTJP    Tenderness comment:  3rd met head - slightly improved  Arch:  Normal  Hallux valgus: No       Left Foot Musculoskeletal   Ecchymosis:  None  Tenderness: none    Arch:  Normal  Hallux valgus: No       MUSCLE STRENGTH     Left Foot Muscle Strength   Foot dorsiflexion:  5  Foot plantar flexion:  5  Foot inversion:  5  Foot eversion:  5     RANGE OF MOTION      Left Foot Range of Motion   Foot and ankle ROM within normal limits       DERMATOLOGIC     Right Foot Dermatologic   Skin: skin intact    Nails: abnormally thick       Left Foot Dermatologic   Skin: skin intact    Nails: abnormally thick        RADIOLOGY/NUCLEAR:  XR Foot 3+ View Right    Result Date: 9/9/2022  Narrative: EXAMINATION:  XR FOOT 3+ VW RIGHT-  9/9/2022 9:11 AM CDT  HISTORY: Metatarsal fractures.  COMPARISON: No comparison study.  TECHNIQUE: 3 views were obtained.  FINDINGS:  There are subacute fractures involving the second, third and fourth metatarsals. There  are fractures at the bases of metatarsals 2 through 4. There is also a distal shaft fracture of the third metatarsal. There is no widening identified of the Lisfranc joint region. There is narrowing of the interphalangeal joint spaces of the second through fifth digits. There is a small plantar calcaneal spur. There is posterior calcaneal enthesopathy.      Impression: 1. Subacute fractures of the second, third and fourth metatarsals. 2. Other nonacute findings, as discussed.  This report was finalized on 09/09/2022 09:44 by Dr. Frankie Ochoa MD.      LABORATORY/CULTURE RESULTS:      PATHOLOGY RESULTS:       ASSESSMENT/PLAN     Diagnoses and all orders for this visit:    1. Closed nondisplaced fracture of second metatarsal bone of right foot with routine healing, subsequent encounter (Primary)  -     XR Foot 3+ View Right; Future    2. Closed displaced fracture of third metatarsal bone of right foot with routine healing, subsequent encounter  -     XR Foot 3+ View Right; Future    3. Closed nondisplaced fracture of fourth metatarsal bone of right foot with routine healing, subsequent encounter  -     XR Foot 3+ View Right; Future    4. Foot pain, right    5. Tobacco abuse      Comprehensive lower extremity examination and evaluation was performed.  Discussed findings and treatment plan including risks, benefits, and treatment options with patient in detail. Patient agreed with treatment plan.  Reviewed previous xrays.  Callus formation to fracture sites.  Updated xrays before next appt.  Remain in CAM for at least 1 additional month and suggest additional NWB with crutches.    Tobacco cessation needed.   Defer surgery due to early signs of healing.   An After Visit Summary was printed and given to the patient at discharge, including (if requested) any available informative/educational handouts regarding diagnosis, treatment, or medications. All questions were answered to patient/family satisfaction. Should symptoms  fail to improve or worsen they agree to call or return to clinic or to go to the Emergency Department. Discussed the importance of following up with any needed screening tests/labs/specialist appointments and any requested follow-up recommended by me today. Importance of maintaining follow-up discussed and patient accepts that missed appointments can delay diagnosis and potentially lead to worsening of conditions.  Return in about 1 month (around 10/23/2022) for Recheck, Follow-up with Podiatry Provider., or sooner if acute issues arise.        This document has been electronically signed by Ricci Forrester DPM on September 23, 2022 10:17 CDT

## 2022-09-23 ENCOUNTER — OFFICE VISIT (OUTPATIENT)
Dept: PODIATRY | Facility: CLINIC | Age: 47
End: 2022-09-23

## 2022-09-23 VITALS
DIASTOLIC BLOOD PRESSURE: 85 MMHG | SYSTOLIC BLOOD PRESSURE: 128 MMHG | HEIGHT: 68 IN | BODY MASS INDEX: 35.16 KG/M2 | HEART RATE: 84 BPM | OXYGEN SATURATION: 98 % | WEIGHT: 232 LBS

## 2022-09-23 DIAGNOSIS — S92.331D CLOSED DISPLACED FRACTURE OF THIRD METATARSAL BONE OF RIGHT FOOT WITH ROUTINE HEALING, SUBSEQUENT ENCOUNTER: ICD-10-CM

## 2022-09-23 DIAGNOSIS — M79.671 FOOT PAIN, RIGHT: ICD-10-CM

## 2022-09-23 DIAGNOSIS — S92.344D CLOSED NONDISPLACED FRACTURE OF FOURTH METATARSAL BONE OF RIGHT FOOT WITH ROUTINE HEALING, SUBSEQUENT ENCOUNTER: ICD-10-CM

## 2022-09-23 DIAGNOSIS — Z72.0 TOBACCO ABUSE: ICD-10-CM

## 2022-09-23 DIAGNOSIS — S92.324D CLOSED NONDISPLACED FRACTURE OF SECOND METATARSAL BONE OF RIGHT FOOT WITH ROUTINE HEALING, SUBSEQUENT ENCOUNTER: Primary | ICD-10-CM

## 2022-09-23 PROCEDURE — 99213 OFFICE O/P EST LOW 20 MIN: CPT | Performed by: PODIATRIST

## 2022-09-23 RX ORDER — EPINEPHRINE 0.3 MG/.3ML
INJECTION SUBCUTANEOUS ONCE
COMMUNITY
Start: 2022-09-19

## 2022-09-23 RX ORDER — RIZATRIPTAN BENZOATE 10 MG/1
10 TABLET ORAL ONCE AS NEEDED
COMMUNITY
Start: 2022-09-13

## 2022-10-14 ENCOUNTER — HOSPITAL ENCOUNTER (OUTPATIENT)
Dept: MRI IMAGING | Facility: HOSPITAL | Age: 47
Discharge: HOME | End: 2022-10-14
Admitting: PHYSICIAN ASSISTANT

## 2022-10-14 DIAGNOSIS — R27.0 ATAXIA: ICD-10-CM

## 2022-10-14 DIAGNOSIS — R20.2 NUMBNESS AND TINGLING: ICD-10-CM

## 2022-10-14 DIAGNOSIS — R20.0 NUMBNESS AND TINGLING: ICD-10-CM

## 2022-10-14 PROCEDURE — 70553 MRI BRAIN STEM W/O & W/DYE: CPT

## 2022-10-14 PROCEDURE — A9577 INJ MULTIHANCE: HCPCS | Performed by: PHYSICIAN ASSISTANT

## 2022-10-14 PROCEDURE — 0 GADOBENATE DIMEGLUMINE 529 MG/ML SOLUTION: Performed by: PHYSICIAN ASSISTANT

## 2022-10-14 RX ADMIN — GADOBENATE DIMEGLUMINE 20 ML: 529 INJECTION, SOLUTION INTRAVENOUS at 10:22

## 2022-10-21 ENCOUNTER — TELEPHONE (OUTPATIENT)
Dept: NEUROLOGY | Facility: CLINIC | Age: 47
End: 2022-10-21

## 2022-10-21 NOTE — TELEPHONE ENCOUNTER
Caller: Paradise English    Relationship: Self    Best call back number: [phone]    What is the best time to reach you: ANYTIME    Who are you requesting to speak with (clinical staff, provider,  specific staff member): CLINICAL STAFF    Do you know the name of the person who called: LUIZ    What was the call regarding: TEST RESULTS    Do you require a callback: YES

## 2022-10-21 NOTE — TELEPHONE ENCOUNTER
Caller: CHAD     Manish call back number: [phone]    What test was performed: MRI     When was the test performed: 10.14.22    Where was the test performed: [facility]     Additional notes: PLEASE CALL PT WITH TEST RESULTS    PLEASE ADVISE.

## 2022-10-24 NOTE — TELEPHONE ENCOUNTER
Caller: Paradise English     Relationship: Self     Best call back number: [phone]    PT CALLING BACK TO GET MRI RESULTS.    PLEASE CALL PT BACK.

## 2022-10-25 ENCOUNTER — TELEPHONE (OUTPATIENT)
Dept: NEUROLOGY | Facility: CLINIC | Age: 47
End: 2022-10-25

## 2022-10-25 NOTE — TELEPHONE ENCOUNTER
Caller: CHAD    Relationship: SELF    Best call back number: [phone]    Caller requesting test results: PT    What test was performed: MRI BRAIN W/WO    When was the test performed: 10-14-22    Where was the test performed: [facility]    Additional notes: PLEASE CALL WITH RESULTS.

## 2022-10-28 ENCOUNTER — TELEPHONE (OUTPATIENT)
Dept: NEUROLOGY | Facility: CLINIC | Age: 47
End: 2022-10-28

## 2022-10-28 DIAGNOSIS — R20.2 NUMBNESS AND TINGLING: ICD-10-CM

## 2022-10-28 DIAGNOSIS — R27.0 ATAXIA: Primary | ICD-10-CM

## 2022-10-28 DIAGNOSIS — R20.0 NUMBNESS AND TINGLING: ICD-10-CM

## 2022-10-28 DIAGNOSIS — R90.89 ABNORMAL BRAIN MRI: ICD-10-CM

## 2022-10-28 NOTE — TELEPHONE ENCOUNTER
----- Message from EULALIO Culver sent at 10/28/2022  3:59 PM CDT -----  Sent for LP        ----- Message -----  From: Lisa Arreola LPN  Sent: 10/21/2022  12:35 PM CDT  To: EULALIO Culver    Received a message from the hub, Paradise would like MRI results.

## 2022-10-28 NOTE — TELEPHONE ENCOUNTER
Caller: CHAD  Relationship to Patient: SELF  Phone Number: [phone]    Reason for Call: PT WAS CALLING TO CHECK ON STATUS. PLEASE RESULTS.

## 2022-11-03 ENCOUNTER — TELEPHONE (OUTPATIENT)
Dept: PODIATRY | Facility: CLINIC | Age: 47
End: 2022-11-03

## 2022-11-03 NOTE — TELEPHONE ENCOUNTER
Called patient regarding appt on 11/04/2022. Left message for patient to return call if any questions or concerns arise.

## 2022-11-09 ENCOUNTER — APPOINTMENT (OUTPATIENT)
Dept: GENERAL RADIOLOGY | Facility: HOSPITAL | Age: 47
End: 2022-11-09

## 2022-11-16 ENCOUNTER — HOSPITAL ENCOUNTER (OUTPATIENT)
Dept: GENERAL RADIOLOGY | Facility: HOSPITAL | Age: 47
Discharge: HOME | End: 2022-11-16

## 2022-11-28 ENCOUNTER — HOSPITAL ENCOUNTER (OUTPATIENT)
Dept: GENERAL RADIOLOGY | Facility: HOSPITAL | Age: 47
Discharge: HOME | End: 2022-11-28
Admitting: PHYSICIAN ASSISTANT

## 2022-11-28 VITALS
HEIGHT: 67 IN | OXYGEN SATURATION: 93 % | BODY MASS INDEX: 37.42 KG/M2 | WEIGHT: 238.4 LBS | RESPIRATION RATE: 18 BRPM | SYSTOLIC BLOOD PRESSURE: 137 MMHG | TEMPERATURE: 98 F | HEART RATE: 79 BPM | DIASTOLIC BLOOD PRESSURE: 87 MMHG

## 2022-11-28 DIAGNOSIS — R20.2 NUMBNESS AND TINGLING: ICD-10-CM

## 2022-11-28 DIAGNOSIS — R90.89 ABNORMAL BRAIN MRI: ICD-10-CM

## 2022-11-28 DIAGNOSIS — R20.0 NUMBNESS AND TINGLING: ICD-10-CM

## 2022-11-28 DIAGNOSIS — R27.0 ATAXIA: ICD-10-CM

## 2022-11-28 LAB
APPEARANCE CSF: CLEAR
APPEARANCE CSF: CLEAR
APTT PPP: 29.4 SECONDS (ref 24.1–35)
COLOR CSF: COLORLESS
COLOR CSF: COLORLESS
COLOR SPUN CSF: COLORLESS
COLOR SPUN CSF: COLORLESS
GLUCOSE CSF-MCNC: 70 MG/DL (ref 40–70)
INR PPP: 1.03 (ref 0.91–1.09)
NUC CELL # CSF MANUAL: 0 /MM3 (ref 0–8)
NUC CELL # CSF MANUAL: 1 /MM3 (ref 0–8)
PLATELET # BLD AUTO: 503 10*3/MM3 (ref 140–450)
PROT CSF-MCNC: 60.1 MG/DL (ref 15–45)
PROTHROMBIN TIME: 13.1 SECONDS (ref 11.9–14.6)
RBC # CSF MANUAL: 15 /MM3 (ref 0–0)
RBC # CSF MANUAL: 6 /MM3 (ref 0–0)
SPECIMEN VOL CSF: 12 ML
SPECIMEN VOL CSF: 12 ML
TUBE # CSF: 1
TUBE # CSF: 3

## 2022-11-28 PROCEDURE — 84157 ASSAY OF PROTEIN OTHER: CPT | Performed by: PHYSICIAN ASSISTANT

## 2022-11-28 PROCEDURE — 83916 OLIGOCLONAL BANDS: CPT | Performed by: PHYSICIAN ASSISTANT

## 2022-11-28 PROCEDURE — 83873 ASSAY OF CSF PROTEIN: CPT | Performed by: PHYSICIAN ASSISTANT

## 2022-11-28 PROCEDURE — 89050 BODY FLUID CELL COUNT: CPT | Performed by: PHYSICIAN ASSISTANT

## 2022-11-28 PROCEDURE — 85730 THROMBOPLASTIN TIME PARTIAL: CPT | Performed by: RADIOLOGY

## 2022-11-28 PROCEDURE — 82784 ASSAY IGA/IGD/IGG/IGM EACH: CPT | Performed by: PHYSICIAN ASSISTANT

## 2022-11-28 PROCEDURE — 82164 ANGIOTENSIN I ENZYME TEST: CPT | Performed by: PHYSICIAN ASSISTANT

## 2022-11-28 PROCEDURE — 0 LIDOCAINE 1 % SOLUTION: Performed by: PHYSICIAN ASSISTANT

## 2022-11-28 PROCEDURE — 82040 ASSAY OF SERUM ALBUMIN: CPT | Performed by: PHYSICIAN ASSISTANT

## 2022-11-28 PROCEDURE — 86592 SYPHILIS TEST NON-TREP QUAL: CPT | Performed by: PHYSICIAN ASSISTANT

## 2022-11-28 PROCEDURE — 85610 PROTHROMBIN TIME: CPT | Performed by: RADIOLOGY

## 2022-11-28 PROCEDURE — 87205 SMEAR GRAM STAIN: CPT | Performed by: PHYSICIAN ASSISTANT

## 2022-11-28 PROCEDURE — 87015 SPECIMEN INFECT AGNT CONCNTJ: CPT | Performed by: PHYSICIAN ASSISTANT

## 2022-11-28 PROCEDURE — 82945 GLUCOSE OTHER FLUID: CPT | Performed by: PHYSICIAN ASSISTANT

## 2022-11-28 PROCEDURE — 82042 OTHER SOURCE ALBUMIN QUAN EA: CPT | Performed by: PHYSICIAN ASSISTANT

## 2022-11-28 PROCEDURE — 86618 LYME DISEASE ANTIBODY: CPT | Performed by: PHYSICIAN ASSISTANT

## 2022-11-28 PROCEDURE — 85049 AUTOMATED PLATELET COUNT: CPT | Performed by: RADIOLOGY

## 2022-11-28 PROCEDURE — 87070 CULTURE OTHR SPECIMN AEROBIC: CPT | Performed by: PHYSICIAN ASSISTANT

## 2022-11-28 RX ORDER — LIDOCAINE HYDROCHLORIDE 10 MG/ML
5 INJECTION, SOLUTION INFILTRATION; PERINEURAL ONCE
Status: COMPLETED | OUTPATIENT
Start: 2022-11-28 | End: 2022-11-28

## 2022-11-28 RX ADMIN — LIDOCAINE HYDROCHLORIDE 5 ML: 10 INJECTION, SOLUTION INFILTRATION; PERINEURAL at 14:04

## 2022-11-30 LAB
ACE CSF-CCNC: <1.5 U/L (ref 0–2.5)
ALB CSF/SERPL: 11 {RATIO} (ref 0–8)
ALBUMIN CSF-MCNC: 44 MG/DL (ref 8–37)
ALBUMIN SERPL-MCNC: 4 G/DL (ref 3.8–4.8)
IGG CSF-MCNC: 5.9 MG/DL (ref 0–6.7)
IGG SERPL-MCNC: 1198 MG/DL (ref 586–1602)
IGG SYNTH RATE SER+CSF CALC-MRATE: -3.9 MG/DAY
IGG/ALB CLEAR SER+CSF-RTO: 0.4 (ref 0–0.7)
IGG/ALB CSF: 0.13 {RATIO} (ref 0–0.25)
MBP CSF-MCNC: 6 NG/ML (ref 0–3.7)
OLIGOCLONAL BANDS.IT SER+CSF QL: ABNORMAL
REAGIN AB CSF QL: NON REACTIVE

## 2022-12-01 LAB
B BURGDOR AB CSF IA-ACNC: 0.15 LIV
BACTERIA SPEC AEROBE CULT: NORMAL
GRAM STN SPEC: NORMAL
GRAM STN SPEC: NORMAL

## 2022-12-06 ENCOUNTER — TELEPHONE (OUTPATIENT)
Dept: NEUROLOGY | Facility: CLINIC | Age: 47
End: 2022-12-06

## 2022-12-06 NOTE — TELEPHONE ENCOUNTER
Caller: Paradise English    Relationship: Self    Best call back number: [phone]    Caller requesting test results: PATIENT    What test was performed: LUMBAR PUNCTURE     When was the test performed: 11/28/2022    Where was the test performed: [facility]    Additional notes: N/A

## 2022-12-06 NOTE — TELEPHONE ENCOUNTER
----- Message from EULALIO Culver sent at 12/6/2022  3:42 PM CST -----  All OK      ----- Message -----  From: Lisa Arreola LPN  Sent: 12/6/2022   9:48 AM CST  To: EULALIO Culver    Received a message from the [facility], Paradise would like LP results.

## 2022-12-15 DIAGNOSIS — R20.2 NUMBNESS AND TINGLING: Primary | ICD-10-CM

## 2022-12-15 DIAGNOSIS — R20.0 NUMBNESS AND TINGLING: Primary | ICD-10-CM

## 2022-12-15 DIAGNOSIS — R27.0 ATAXIA: ICD-10-CM

## 2023-01-12 ENCOUNTER — TELEPHONE (OUTPATIENT)
Dept: PODIATRY | Facility: CLINIC | Age: 48
End: 2023-01-12
Payer: COMMERCIAL

## 2023-01-12 NOTE — TELEPHONE ENCOUNTER
LEFT VM FOR PT TO INFORM DR RING WILL BE OUT OF THE OFFICE AND WE WILL RESCHEDULE AT A LATER TIME.

## 2023-01-30 ENCOUNTER — TELEPHONE (OUTPATIENT)
Dept: PODIATRY | Facility: CLINIC | Age: 48
End: 2023-01-30
Payer: COMMERCIAL

## 2023-02-02 ENCOUNTER — TELEPHONE (OUTPATIENT)
Dept: PODIATRY | Facility: CLINIC | Age: 48
End: 2023-02-02
Payer: COMMERCIAL

## 2023-02-02 NOTE — TELEPHONE ENCOUNTER
Called patient and went over new appointment time and date left call back information if appointment will not work.

## 2023-02-03 ENCOUNTER — OFFICE VISIT (OUTPATIENT)
Dept: NEUROLOGY | Facility: CLINIC | Age: 48
End: 2023-02-03
Payer: COMMERCIAL

## 2023-02-03 VITALS
DIASTOLIC BLOOD PRESSURE: 88 MMHG | OXYGEN SATURATION: 98 % | BODY MASS INDEX: 36.53 KG/M2 | HEART RATE: 82 BPM | SYSTOLIC BLOOD PRESSURE: 138 MMHG | WEIGHT: 241 LBS | HEIGHT: 68 IN

## 2023-02-03 DIAGNOSIS — F41.9 ANXIETY DISORDER, UNSPECIFIED TYPE: ICD-10-CM

## 2023-02-03 DIAGNOSIS — F44.7 FUNCTIONAL NEUROLOGICAL SYMPTOM DISORDER WITH MIXED SYMPTOMS: Primary | ICD-10-CM

## 2023-02-03 PROCEDURE — 99214 OFFICE O/P EST MOD 30 MIN: CPT | Performed by: PHYSICIAN ASSISTANT

## 2023-02-03 RX ORDER — CARIPRAZINE 4.5 MG/1
1 CAPSULE, GELATIN COATED ORAL DAILY
COMMUNITY
Start: 2023-01-19

## 2023-02-03 NOTE — PROGRESS NOTES
Subjective   Paradise English is a 47 y.o. female is here today for follow-up.    History of Present Illness     The patient who presents for follow-up of lower extremity numbness, tingling, feeling of bladder changes, and leg spasms.    Neurological problems  The patient states her feet, legs, and feet are worse. She states she is waiting to get back in to see her doctor. The patient states she has been practicing walking on her heel at home without the boot. She states she was advised she had 3 of her metatarsals that had 2 fractures in them. The patient states she has had more weakness in her legs. She states the biggest problem she has had is the pain she has been talking about which has gotten worse. The patient states when her legs are touched, she can not describe it. She states her grandson can not sit on her lap because it hurts. The patient states if he puts weight on it, it hurts in both of her legs. She states her feet do not really bother her because they are numb. She states she can pretty much stand on a day in front of her not noticed when it is hot. The patient states she fractured her foot when she stood up and took 2 steps and fell and landed on her feet. She states she has been wearing the boot for 6 months. The patient states she has an appointment between 02/18/2023 and 02/21/2023. She states she had an appointment set up for a nerve conduction study, but the doctor was sick that day. The patient states her next one is scheduled for 03/01/2023.     Anxiety  The patient states she sees Dr. Jaramillo at [place] for her anxiety.    The following portions of the patient's history were reviewed and updated as appropriate: allergies, current medications, past family history, past medical history, past social history, past surgical history and problem list.    Review of Systems:  A review of systems was performed, and positive findings are noted in the HPI.      Current Outpatient Medications:   •   amitriptyline (ELAVIL) 100 MG tablet, Take 200 mg by mouth every night at bedtime., Disp: , Rfl: 2  •  amphetamine-dextroamphetamine (ADDERALL) 20 MG tablet, Take 1 tablet by mouth 3 (Three) Times a Day., Disp: , Rfl: 0  •  EPINEPHrine (EPIPEN) 0.3 MG/0.3ML solution auto-injector injection, Inject  under the skin into the appropriate area as directed 1 (One) Time., Disp: , Rfl:   •  imipramine (TOFRANIL) 50 MG tablet, Take 50 mg by mouth 2 (Two) Times a Day., Disp: , Rfl: 2  •  Lurasidone HCl (LATUDA) 120 MG tablet tablet, Take 40 mg by mouth Daily., Disp: , Rfl:   •  montelukast (SINGULAIR) 10 MG tablet, Take 10 mg by mouth Every Night., Disp: , Rfl:   •  OXcarbazepine (TRILEPTAL) 150 MG tablet, Take 150 mg by mouth 2 (Two) Times a Day., Disp: , Rfl:   •  ProAir  (90 Base) MCG/ACT inhaler, Inhale 1 puff Every 6 (Six) Hours As Needed., Disp: , Rfl:   •  rizatriptan (MAXALT) 10 MG tablet, Take 10 mg by mouth 1 (One) Time As Needed., Disp: , Rfl:   •  Vraylar 4.5 MG capsule, Take 1 capsule by mouth Daily., Disp: , Rfl:      Objective   Physical Exam  Vitals and nursing note reviewed.   HENT:      Head: Normocephalic.      Right Ear: Hearing and external ear normal.      Left Ear: Hearing and external ear normal.      Nose: Nose normal.      Mouth/Throat:      Pharynx: Oropharynx is clear.   Eyes:      General: Lids are normal. Vision grossly intact. Gaze aligned appropriately. No scleral icterus.     Extraocular Movements: Extraocular movements intact.      Conjunctiva/sclera: Conjunctivae normal.      Pupils: Pupils are equal, round, and reactive to light.      Visual Fields: Right eye visual fields normal and left eye visual fields normal.   Neck:      Vascular: No carotid bruit or JVD.      Trachea: Trachea and phonation normal.   Cardiovascular:      Rate and Rhythm: Normal rate.      Heart sounds: Normal heart sounds.   Pulmonary:      Effort: Pulmonary effort is normal.      Breath sounds: Normal breath  sounds.   Musculoskeletal:      Cervical back: Normal range of motion.   Skin:     General: Skin is warm and dry.   Neurological:      Mental Status: She is alert and oriented to person, place, and time.      GCS: GCS eye subscore is 4. GCS verbal subscore is 5. GCS motor subscore is 6.      Sensory: Sensation is intact.      Motor: Motor function is intact.      Coordination: Coordination is intact.      Gait: Gait is intact.      Deep Tendon Reflexes: Reflexes are normal and symmetric.      Comments: Mental status alert and oriented to person, place and time. Ziyad coma score 4, 5, and 6. Cranial nerves 2 through 12 are functioning symmetrically. Sensation appears intact in the upper extremities. She has some patchy decrease in sensation in the left lower extremity. Right lower extremity remains immobilized in a well worn ankle support. The patient walks without assistive device, otherwise, motor function appears intact. Coordination and gait are abnormal due to her right lower extremity issues. Deep tendon reflexes are present and symmetric. She does have bilateral Brooke's.   Psychiatric:         Attention and Perception: Attention and perception normal.         Mood and Affect: Mood and affect normal.         Speech: Speech normal.         Behavior: Behavior normal.         Thought Content: Thought content normal.         Cognition and Memory: Cognition and memory normal.         Judgment: Judgment normal.           Assessment & Plan   Diagnoses and all orders for this visit:    1. Functional neurological symptom disorder with mixed symptoms (Primary)    2. Anxiety disorder, unspecified type       Transcribed from ambient dictation for EULALIO Culver by Sendy Pereyra.  02/03/23   11:52 CST    Patient or patient representative verbalized consent to the visit recording.  I have personally performed the services described in this document as transcribed by the above individual, and it is both  accurate and complete.

## 2023-02-08 ENCOUNTER — PREP FOR SURGERY (OUTPATIENT)
Dept: OTHER | Facility: HOSPITAL | Age: 48
End: 2023-02-08
Payer: COMMERCIAL

## 2023-02-08 ENCOUNTER — OFFICE VISIT (OUTPATIENT)
Dept: SURGERY | Facility: CLINIC | Age: 48
End: 2023-02-08
Payer: COMMERCIAL

## 2023-02-08 VITALS
SYSTOLIC BLOOD PRESSURE: 145 MMHG | DIASTOLIC BLOOD PRESSURE: 99 MMHG | TEMPERATURE: 97.5 F | OXYGEN SATURATION: 97 % | HEIGHT: 68 IN | BODY MASS INDEX: 36.53 KG/M2 | HEART RATE: 91 BPM | WEIGHT: 241 LBS

## 2023-02-08 DIAGNOSIS — Z12.31 SCREENING MAMMOGRAM FOR HIGH-RISK PATIENT: Primary | ICD-10-CM

## 2023-02-08 DIAGNOSIS — N63.13 MASS OF LOWER OUTER QUADRANT OF RIGHT BREAST: ICD-10-CM

## 2023-02-08 DIAGNOSIS — N63.13 MASS OF LOWER OUTER QUADRANT OF RIGHT BREAST: Primary | ICD-10-CM

## 2023-02-08 DIAGNOSIS — R92.2 DENSE BREAST TISSUE ON MAMMOGRAM: ICD-10-CM

## 2023-02-08 DIAGNOSIS — Z80.3 FAMILY HISTORY OF BREAST CANCER: ICD-10-CM

## 2023-02-08 PROCEDURE — 99203 OFFICE O/P NEW LOW 30 MIN: CPT | Performed by: SPECIALIST

## 2023-02-09 NOTE — PROGRESS NOTES
Romina Man MD [facility] History and Physical     Referring Provider: Antwon Ram APRN      Chief complaint   Chief Complaint   Patient presents with   • Mass     Mrs. English is here today with a mass in the right breast         Subjective .      History of present illness:  Paradise English is a 47 y.o. female who presents after recent bilateral screening mammogram demonstrated an 8mm mass at 8 o'clock on the right.  Right ultrasound demonstrated an ill-defined soft tissue mass measuring 1.3cm.  It did shadow.  She states that she recently noted the mass.  she denies any associated skin or nipple changes.  Her most recent mammogram was in 2014.    History    Past Medical History:   Diagnosis Date   • ADD (attention deficit disorder)    • Anxiety    • Bipolar 1 disorder (HCC)    • CTS (carpal tunnel syndrome) 04/19   • Difficulty walking 05/22   • Low back pain    • Migraine 01/12   • Peripheral neuropathy    • Shingles 06/19   ,   Past Surgical History:   Procedure Laterality Date   • HERNIA REPAIR     • LAPAROSCOPIC TUBAL LIGATION     • SPLENECTOMY     • TONSILLECTOMY     ,   Family History   Problem Relation Age of Onset   • Stroke Mother    • Diabetes Mother    • Stroke Maternal Grandmother    • Diabetes Maternal Grandmother    • Dementia Maternal Grandmother    • Cancer Father    • Diabetes Maternal Grandfather    ,   Social History     Tobacco Use   • Smoking status: Some Days     Packs/day: 0.50     Years: 15.00     Pack years: 7.50     Types: Cigarettes   Substance Use Topics   • Alcohol use: Not Currently   • Drug use: No   , (Not in a hospital admission)   and Allergies:  Penicillins, Tramadol, Mobic [meloxicam], Neurontin [gabapentin], Toradol [ketorolac tromethamine], Coconut, and Tizanidine    Current Outpatient Medications:   •  amitriptyline (ELAVIL) 100 MG tablet, Take 200 mg by mouth every night at bedtime., Disp: , Rfl: 2  •  amphetamine-dextroamphetamine (ADDERALL) 20 MG tablet, Take 1 tablet  "by mouth 3 (Three) Times a Day., Disp: , Rfl: 0  •  EPINEPHrine (EPIPEN) 0.3 MG/0.3ML solution auto-injector injection, Inject  under the skin into the appropriate area as directed 1 (One) Time., Disp: , Rfl:   •  imipramine (TOFRANIL) 50 MG tablet, Take 50 mg by mouth 2 (Two) Times a Day., Disp: , Rfl: 2  •  montelukast (SINGULAIR) 10 MG tablet, Take 10 mg by mouth Every Night., Disp: , Rfl:   •  OXcarbazepine (TRILEPTAL) 150 MG tablet, Take 150 mg by mouth 2 (Two) Times a Day., Disp: , Rfl:   •  ProAir  (90 Base) MCG/ACT inhaler, Inhale 1 puff Every 6 (Six) Hours As Needed., Disp: , Rfl:   •  rizatriptan (MAXALT) 10 MG tablet, Take 10 mg by mouth 1 (One) Time As Needed., Disp: , Rfl:   •  Vraylar 4.5 MG capsule, Take 1 capsule by mouth Daily., Disp: , Rfl:   •  Lurasidone HCl (LATUDA) 120 MG tablet tablet, Take 40 mg by mouth Daily., Disp: , Rfl:     GYN History:  Age of first menses    14    Age of first live birth    21    Age of menopause    Continues monthly cycles    Hormone replacement therapy  no    Family history breast cancer   P cousin 42, aunt 46 and 54, aunt 39    Family history ovarian cancer   no    History breast biopsy    no        Review of Systems:    All organ systems were evaluated and found negative except those which are mentioned in the History of Present Illness.      Objective     Physical Exam:    Vital Signs   /99   Pulse 91   Temp 97.5 °F (36.4 °C)   Ht 172.7 cm (68\")   Wt 109 kg (241 lb)   LMP  (LMP Unknown)   SpO2 97%   BMI 36.64 kg/m²        Constitutional:    Well-developed, well-nourished in no acute distress  Eyes:     Extraocular movements intact; pupils equal, round, and reactive  Ears, Nose, Mouth, Throat:  Hearing intact, nose midline, no mucosal lesions  Cardiovascular:   Regular rate and rhythm   Respiratory:    Clear to auscultation bilaterally  Gastrointestinal:   Soft, nontender, nondistended, no masses, bowel sounds " intact  Genitourinary:    Deferred  Musculoskeletal:   Full range of motion, no muscle wasting, no weakness  Skin:     No rashes or excoriations  Neurological:    Moves all extremities, sensation intact  Psychiatric:    Alert and oriented to person, place, and time  Hematologic/Lymphatic/Immune: No lymphadenopathy  Breast     Nipples everted without expressible discharge.  No erythema, edema, induration, or dimpling.   8 o'clock on right angular rubbery mass just deep to skin   No axillary adenopathy.      Imaging:   [facility] screening mammogram, R  01/06/23:  8mm shadowing mass 8 o'clock    Class 2 Severe Obesity (BMI >=35 and <=39.9). Obesity-related health conditions include the following: none. Obesity is newly identified. BMI is is above average; BMI management plan is completed. We discussed portion control and increasing exercise.    Assessment & Plan       Diagnoses and all orders for this visit:    1. Screening mammogram for high-risk patient (Primary)  -     MRI Breast Bilateral With & Without Contrast; Future    2. Dense breast tissue on mammogram  -     MRI Breast Bilateral With & Without Contrast; Future    3. Family history of breast cancer  -     MRI Breast Bilateral With & Without Contrast; Future    4. Mass of lower outer quadrant of right breast  -     MRI Breast Bilateral With & Without Contrast; Future           A comprehensive discussion of the breast including her clinical findings and imaging was undertaken.  She will be scheduled for ultrasound-guided biopsy of the right  breast.  She will return to discuss the pathology and to plan further required surgical treatment.       Due to her family history and now palpable mass, she will also be scheduled for MRI evaluation.    An extensive review of patient intake forms, referring physician documents, laboratories, and imaging was performed in the medical decision making and surgical planning of this patient.          Romina Man,  MD

## 2023-02-15 ENCOUNTER — HOSPITAL ENCOUNTER (OUTPATIENT)
Dept: MAMMOGRAPHY | Facility: HOSPITAL | Age: 48
Discharge: HOME | End: 2023-02-15
Payer: COMMERCIAL

## 2023-02-15 ENCOUNTER — APPOINTMENT (OUTPATIENT)
Dept: OTHER | Facility: HOSPITAL | Age: 48
End: 2023-02-15
Payer: COMMERCIAL

## 2023-02-15 ENCOUNTER — HOSPITAL ENCOUNTER (OUTPATIENT)
Dept: ULTRASOUND IMAGING | Facility: HOSPITAL | Age: 48
Discharge: HOME | End: 2023-02-15
Payer: COMMERCIAL

## 2023-02-15 DIAGNOSIS — Z00.6 ENCOUNTER FOR EXAMINATION FOR NORMAL COMPARISON AND CONTROL IN CLINICAL RESEARCH PROGRAM: ICD-10-CM

## 2023-02-15 DIAGNOSIS — N63.13 MASS OF LOWER OUTER QUADRANT OF RIGHT BREAST: ICD-10-CM

## 2023-02-15 PROCEDURE — 88305 TISSUE EXAM BY PATHOLOGIST: CPT | Performed by: SPECIALIST

## 2023-02-15 PROCEDURE — A4648 IMPLANTABLE TISSUE MARKER: HCPCS

## 2023-02-15 RX ORDER — LIDOCAINE HYDROCHLORIDE AND EPINEPHRINE 10; 10 MG/ML; UG/ML
10 INJECTION, SOLUTION INFILTRATION; PERINEURAL ONCE
Status: DISPENSED | OUTPATIENT
Start: 2023-02-15

## 2023-02-15 RX ORDER — LIDOCAINE HYDROCHLORIDE 10 MG/ML
10 INJECTION, SOLUTION INFILTRATION; PERINEURAL ONCE
Status: DISPENSED | OUTPATIENT
Start: 2023-02-15

## 2023-02-16 LAB
CYTO UR: NORMAL
LAB AP CASE REPORT: NORMAL
LAB AP CLINICAL INFORMATION: NORMAL
Lab: NORMAL
PATH REPORT.FINAL DX SPEC: NORMAL
PATH REPORT.GROSS SPEC: NORMAL

## 2023-03-07 ENCOUNTER — HOSPITAL ENCOUNTER (OUTPATIENT)
Dept: MRI IMAGING | Facility: HOSPITAL | Age: 48
Discharge: HOME | End: 2023-03-07
Admitting: SPECIALIST
Payer: COMMERCIAL

## 2023-03-07 DIAGNOSIS — Z12.31 SCREENING MAMMOGRAM FOR HIGH-RISK PATIENT: ICD-10-CM

## 2023-03-07 DIAGNOSIS — N63.13 MASS OF LOWER OUTER QUADRANT OF RIGHT BREAST: ICD-10-CM

## 2023-03-07 DIAGNOSIS — Z80.3 FAMILY HISTORY OF BREAST CANCER: ICD-10-CM

## 2023-03-07 DIAGNOSIS — R92.2 DENSE BREAST TISSUE ON MAMMOGRAM: ICD-10-CM

## 2023-03-07 LAB — CREAT BLDA-MCNC: 0.8 MG/DL (ref 0.6–1.3)

## 2023-03-07 PROCEDURE — 77049 MRI BREAST C-+ W/CAD BI: CPT

## 2023-03-07 PROCEDURE — A9577 INJ MULTIHANCE: HCPCS | Performed by: SPECIALIST

## 2023-03-07 PROCEDURE — 82565 ASSAY OF CREATININE: CPT

## 2023-03-07 PROCEDURE — 0 GADOBENATE DIMEGLUMINE 529 MG/ML SOLUTION: Performed by: SPECIALIST

## 2023-03-07 RX ADMIN — GADOBENATE DIMEGLUMINE 20 ML: 529 INJECTION, SOLUTION INTRAVENOUS at 10:23

## 2023-03-29 ENCOUNTER — HOSPITAL ENCOUNTER (OUTPATIENT)
Dept: NEUROLOGY | Facility: HOSPITAL | Age: 48
Discharge: HOME | End: 2023-03-29
Admitting: PHYSICIAN ASSISTANT
Payer: COMMERCIAL

## 2023-03-29 DIAGNOSIS — R20.0 NUMBNESS AND TINGLING: ICD-10-CM

## 2023-03-29 DIAGNOSIS — R27.0 ATAXIA: ICD-10-CM

## 2023-03-29 DIAGNOSIS — R20.2 NUMBNESS AND TINGLING: ICD-10-CM

## 2023-03-29 PROCEDURE — 95886 MUSC TEST DONE W/N TEST COMP: CPT

## 2023-03-29 PROCEDURE — 95912 NRV CNDJ TEST 11-12 STUDIES: CPT

## 2023-03-30 ENCOUNTER — OFFICE VISIT (OUTPATIENT)
Dept: SURGERY | Age: 48
End: 2023-03-30
Payer: MEDICAID

## 2023-03-30 VITALS
WEIGHT: 238 LBS | SYSTOLIC BLOOD PRESSURE: 118 MMHG | HEIGHT: 68 IN | HEART RATE: 80 BPM | DIASTOLIC BLOOD PRESSURE: 78 MMHG | BODY MASS INDEX: 36.07 KG/M2

## 2023-03-30 DIAGNOSIS — R92.8 ABNORMAL MRI, BREAST: ICD-10-CM

## 2023-03-30 DIAGNOSIS — N63.20 MASS OF LEFT BREAST, UNSPECIFIED QUADRANT: Primary | ICD-10-CM

## 2023-03-30 PROCEDURE — G8417 CALC BMI ABV UP PARAM F/U: HCPCS | Performed by: PHYSICIAN ASSISTANT

## 2023-03-30 PROCEDURE — 99202 OFFICE O/P NEW SF 15 MIN: CPT | Performed by: PHYSICIAN ASSISTANT

## 2023-03-30 PROCEDURE — 4004F PT TOBACCO SCREEN RCVD TLK: CPT | Performed by: PHYSICIAN ASSISTANT

## 2023-03-30 PROCEDURE — G8484 FLU IMMUNIZE NO ADMIN: HCPCS | Performed by: PHYSICIAN ASSISTANT

## 2023-03-30 PROCEDURE — G8427 DOCREV CUR MEDS BY ELIG CLIN: HCPCS | Performed by: PHYSICIAN ASSISTANT

## 2023-03-30 RX ORDER — LURASIDONE HYDROCHLORIDE 20 MG/1
TABLET, FILM COATED ORAL
COMMUNITY
Start: 2023-01-19

## 2023-03-30 RX ORDER — DEXTROAMPHETAMINE SACCHARATE, AMPHETAMINE ASPARTATE, DEXTROAMPHETAMINE SULFATE AND AMPHETAMINE SULFATE 5; 5; 5; 5 MG/1; MG/1; MG/1; MG/1
1 TABLET ORAL 3 TIMES DAILY
COMMUNITY
Start: 2018-09-04

## 2023-03-30 RX ORDER — AMITRIPTYLINE HYDROCHLORIDE 100 MG/1
TABLET, FILM COATED ORAL
COMMUNITY
Start: 2023-01-19

## 2023-03-30 RX ORDER — OMEPRAZOLE 40 MG/1
CAPSULE, DELAYED RELEASE ORAL
COMMUNITY
Start: 2023-02-24

## 2023-03-30 RX ORDER — EPINEPHRINE 0.3 MG/.3ML
INJECTION SUBCUTANEOUS ONCE
COMMUNITY
Start: 2022-09-19

## 2023-03-30 RX ORDER — ALBUTEROL SULFATE 90 UG/1
1 AEROSOL, METERED RESPIRATORY (INHALATION) EVERY 6 HOURS PRN
COMMUNITY
Start: 2022-06-23

## 2023-03-30 NOTE — PROGRESS NOTES
Subjective  Faustina Marie         Objective  There are no problems to display for this patient. Current Outpatient Medications   Medication Sig Dispense Refill    amphetamine-dextroamphetamine (ADDERALL) 20 MG tablet Take 1 tablet by mouth 3 times daily. albuterol sulfate HFA (PROVENTIL;VENTOLIN;PROAIR) 108 (90 Base) MCG/ACT inhaler Inhale 1 puff into the lungs every 6 hours as needed      amitriptyline (ELAVIL) 100 MG tablet Take two tablets by mouth at bedtime      EPINEPHrine (EPIPEN) 0.3 MG/0.3ML SOAJ injection Inject into the skin once      LATUDA 20 MG TABS tablet Take one tablet by mouth twice a day FOR 7 days then once daily FOR 7 days then STOP      omeprazole (PRILOSEC) 40 MG delayed release capsule TAKE ONE CAPSULE BY MOUTH DAILY FOR allergies       No current facility-administered medications for this visit. Allergies Patient has no allergy information on record. No past medical history on file. Past Surgical History:   Procedure Laterality Date    HERNIA REPAIR      SPLENECTOMY      TUBAL LIGATION         Family History   Problem Relation Age of Onset    Cancer Father         Throat    Kidney Cancer Paternal Grandmother     Stomach Cancer Paternal Grandfather     Breast Cancer Paternal Aunt     Breast Cancer Paternal Aunt     Breast Cancer Maternal Cousin        Social History     Tobacco Use    Smoking status: Not on file    Smokeless tobacco: Not on file   Substance Use Topics    Alcohol use: Not on file      Breast MRI  1. Approximately 3.7 x 1.4 cm area of non-masslike enhancement in the upper outer quadrant left breast with mixed enhancement kinetics. A second look ultrasound is recommended in this area to further evaluate. Ductal carcinoma in situ is considered in the differential diagnosis.  There is some linear streaky density on the patient's recent mammogram in this area on 01/06/2023.    2. A 1 cm linear and slightly lobular area of enhancement at 10:00 in the right

## 2023-03-31 ENCOUNTER — TRANSCRIBE ORDERS (OUTPATIENT)
Dept: ADMINISTRATIVE | Facility: HOSPITAL | Age: 48
End: 2023-03-31
Payer: COMMERCIAL

## 2023-03-31 DIAGNOSIS — N63.20 MASS OF LEFT BREAST, UNSPECIFIED QUADRANT: Primary | ICD-10-CM

## 2023-04-06 ENCOUNTER — HOSPITAL ENCOUNTER (OUTPATIENT)
Dept: ULTRASOUND IMAGING | Facility: HOSPITAL | Age: 48
Discharge: HOME | End: 2023-04-06
Admitting: PHYSICIAN ASSISTANT
Payer: COMMERCIAL

## 2023-04-06 ENCOUNTER — TELEPHONE (OUTPATIENT)
Dept: VASCULAR SURGERY | Facility: CLINIC | Age: 48
End: 2023-04-06
Payer: COMMERCIAL

## 2023-04-06 DIAGNOSIS — R93.89 ABNORMAL FINDING OF DIAGNOSTIC IMAGING: Primary | ICD-10-CM

## 2023-04-06 DIAGNOSIS — N63.20 MASS OF LEFT BREAST, UNSPECIFIED QUADRANT: ICD-10-CM

## 2023-04-06 PROCEDURE — 76642 ULTRASOUND BREAST LIMITED: CPT

## 2023-04-13 ENCOUNTER — OFFICE VISIT (OUTPATIENT)
Dept: SURGERY | Age: 48
End: 2023-04-13

## 2023-04-13 VITALS
OXYGEN SATURATION: 99 % | WEIGHT: 244.8 LBS | TEMPERATURE: 97 F | BODY MASS INDEX: 37.1 KG/M2 | HEIGHT: 68 IN | HEART RATE: 83 BPM

## 2023-04-13 DIAGNOSIS — N63.10 MASS OF RIGHT BREAST, UNSPECIFIED QUADRANT: Primary | ICD-10-CM

## 2023-04-14 ENCOUNTER — TELEPHONE (OUTPATIENT)
Dept: NEUROLOGY | Facility: CLINIC | Age: 48
End: 2023-04-14
Payer: COMMERCIAL

## 2023-04-14 NOTE — TELEPHONE ENCOUNTER
Caller: [facility]     Relationship:     Best call back number: [phone]    What test was performed: EMG     When was the test performed: 03.29.23    Where was the test performed: [facility]     Additional notes: PT WOULD LIKE TEST RESULTS    PLEASE CALL AND ADVIES

## 2023-05-10 ENCOUNTER — HOSPITAL ENCOUNTER (OUTPATIENT)
Dept: WOMENS IMAGING | Age: 48
Discharge: HOME | End: 2023-05-10
Payer: MEDICAID

## 2023-05-10 DIAGNOSIS — N63.10 MASS OF RIGHT BREAST, UNSPECIFIED QUADRANT: ICD-10-CM

## 2023-05-10 PROCEDURE — 88305 TISSUE EXAM BY PATHOLOGIST: CPT

## 2023-05-10 PROCEDURE — 19083 BX BREAST 1ST LESION US IMAG: CPT

## 2023-05-10 PROCEDURE — 77065 DX MAMMO INCL CAD UNI: CPT

## 2023-05-16 ENCOUNTER — TELEPHONE (OUTPATIENT)
Dept: SURGERY | Age: 48
End: 2023-05-16

## 2023-06-05 ENCOUNTER — TELEPHONE (OUTPATIENT)
Dept: SURGERY | Age: 48
End: 2023-06-05

## 2023-06-05 ENCOUNTER — TRANSCRIBE ORDERS (OUTPATIENT)
Dept: ADMINISTRATIVE | Facility: HOSPITAL | Age: 48
End: 2023-06-05
Payer: COMMERCIAL

## 2023-06-05 DIAGNOSIS — R92.8 ABNORMAL MRI, BREAST: Primary | ICD-10-CM

## 2023-06-05 DIAGNOSIS — D24.1 FIBROADENOMA OF RIGHT BREAST: ICD-10-CM

## 2023-06-05 DIAGNOSIS — D24.1 FIBROADENOMA, RIGHT: ICD-10-CM

## 2023-06-05 DIAGNOSIS — R92.8 ABNORMAL MAMMOGRAM: Primary | ICD-10-CM

## 2023-06-05 NOTE — TELEPHONE ENCOUNTER
Patient was informed:    MRI Breast/[place] @[facility] scheduled on 9/8/2023 at 8:30 MA. All instructions for test were given.     Will see Arcadio Sellers on [email or web address]

## 2023-08-03 ENCOUNTER — TELEPHONE (OUTPATIENT)
Dept: PODIATRY | Facility: CLINIC | Age: 48
End: 2023-08-03
Payer: COMMERCIAL

## 2023-08-04 ENCOUNTER — TELEPHONE (OUTPATIENT)
Dept: SURGERY | Age: 48
End: 2023-08-04

## 2023-08-04 NOTE — TELEPHONE ENCOUNTER
Called and left vm asking if patient had any imaging done recently and if so to bring that on a disc if it was not done here or at [address] [place] [address].

## 2023-08-14 ENCOUNTER — OFFICE VISIT (OUTPATIENT)
Dept: SURGERY | Age: 48
End: 2023-08-14
Payer: COMMERCIAL

## 2023-08-14 VITALS
BODY MASS INDEX: 36.68 KG/M2 | HEIGHT: 68 IN | DIASTOLIC BLOOD PRESSURE: 68 MMHG | WEIGHT: 242 LBS | SYSTOLIC BLOOD PRESSURE: 110 MMHG

## 2023-08-14 DIAGNOSIS — N63.10 MASS OF RIGHT BREAST, UNSPECIFIED QUADRANT: Primary | ICD-10-CM

## 2023-08-14 PROCEDURE — 99213 OFFICE O/P EST LOW 20 MIN: CPT | Performed by: PHYSICIAN ASSISTANT

## 2023-09-08 ENCOUNTER — HOSPITAL ENCOUNTER (OUTPATIENT)
Dept: MRI IMAGING | Facility: HOSPITAL | Age: 48
End: 2023-09-08
Payer: COMMERCIAL

## 2023-09-08 ENCOUNTER — HOSPITAL ENCOUNTER (OUTPATIENT)
Dept: MAMMOGRAPHY | Facility: HOSPITAL | Age: 48
Discharge: HOME | End: 2023-09-08
Admitting: PHYSICIAN ASSISTANT
Payer: COMMERCIAL

## 2023-09-08 PROCEDURE — 77065 DX MAMMO INCL CAD UNI: CPT

## 2023-09-08 PROCEDURE — G0279 TOMOSYNTHESIS, MAMMO: HCPCS

## 2023-09-09 ENCOUNTER — PATIENT MESSAGE (OUTPATIENT)
Dept: NEUROLOGY | Facility: CLINIC | Age: 48
End: 2023-09-09
Payer: COMMERCIAL

## 2023-09-12 NOTE — TELEPHONE ENCOUNTER
Explained that Robert said she has CTS on the right, lab was okay, no sign of MS.  He would like her to keep her follow up appointment next month.

## 2023-09-18 ENCOUNTER — HOSPITAL ENCOUNTER (OUTPATIENT)
Dept: MRI IMAGING | Facility: HOSPITAL | Age: 48
Discharge: HOME | End: 2023-09-18
Payer: COMMERCIAL

## 2023-09-20 ENCOUNTER — TELEPHONE (OUTPATIENT)
Dept: VASCULAR SURGERY | Age: 48
End: 2023-09-20

## 2023-09-20 NOTE — TELEPHONE ENCOUNTER
Called patient to let her know that she needed to bring imaging from [facility] with her to appt. She states that she has covid and has to cancel appts. She will call back when  she gets better and gets her imaging redone.

## 2023-10-16 ENCOUNTER — HOSPITAL ENCOUNTER (OUTPATIENT)
Dept: MRI IMAGING | Facility: HOSPITAL | Age: 48
Discharge: HOME | End: 2023-10-16
Payer: COMMERCIAL

## 2023-10-23 ENCOUNTER — OFFICE VISIT (OUTPATIENT)
Dept: NEUROLOGY | Facility: CLINIC | Age: 48
End: 2023-10-23
Payer: COMMERCIAL

## 2023-10-23 ENCOUNTER — LAB (OUTPATIENT)
Dept: LAB | Facility: HOSPITAL | Age: 48
End: 2023-10-23
Payer: COMMERCIAL

## 2023-10-23 VITALS
WEIGHT: 244 LBS | OXYGEN SATURATION: 97 % | BODY MASS INDEX: 36.98 KG/M2 | SYSTOLIC BLOOD PRESSURE: 124 MMHG | DIASTOLIC BLOOD PRESSURE: 80 MMHG | HEART RATE: 83 BPM | HEIGHT: 68 IN

## 2023-10-23 DIAGNOSIS — R20.0 NUMBNESS AND TINGLING: ICD-10-CM

## 2023-10-23 DIAGNOSIS — R27.0 ATAXIA: ICD-10-CM

## 2023-10-23 DIAGNOSIS — F44.7 FUNCTIONAL NEUROLOGICAL SYMPTOM DISORDER WITH MIXED SYMPTOMS: Primary | ICD-10-CM

## 2023-10-23 DIAGNOSIS — R20.2 NUMBNESS AND TINGLING: ICD-10-CM

## 2023-10-23 DIAGNOSIS — M79.10 MYALGIA: ICD-10-CM

## 2023-10-23 LAB
ALBUMIN SERPL-MCNC: 4 G/DL (ref 3.5–5.2)
ALBUMIN/GLOB SERPL: 1.3 G/DL
ALP SERPL-CCNC: 93 U/L (ref 39–117)
ALT SERPL W P-5'-P-CCNC: 16 U/L (ref 1–33)
ANION GAP SERPL CALCULATED.3IONS-SCNC: 8 MMOL/L (ref 5–15)
AST SERPL-CCNC: 13 U/L (ref 1–32)
BILIRUB SERPL-MCNC: 0.2 MG/DL (ref 0–1.2)
BUN SERPL-MCNC: 9 MG/DL (ref 6–20)
BUN/CREAT SERPL: 13.8 (ref 7–25)
BURR CELLS BLD QL SMEAR: ABNORMAL
CALCIUM SPEC-SCNC: 9.2 MG/DL (ref 8.6–10.5)
CHLORIDE SERPL-SCNC: 102 MMOL/L (ref 98–107)
CO2 SERPL-SCNC: 27 MMOL/L (ref 22–29)
CREAT SERPL-MCNC: 0.65 MG/DL (ref 0.57–1)
DEPRECATED RDW RBC AUTO: 50.7 FL (ref 37–54)
EGFRCR SERPLBLD CKD-EPI 2021: 109.4 ML/MIN/1.73
EOSINOPHIL # BLD MANUAL: 0.36 10*3/MM3 (ref 0–0.4)
EOSINOPHIL NFR BLD MANUAL: 2.1 % (ref 0.3–6.2)
ERYTHROCYTE [DISTWIDTH] IN BLOOD BY AUTOMATED COUNT: 14.6 % (ref 12.3–15.4)
ERYTHROCYTE [SEDIMENTATION RATE] IN BLOOD: 43 MM/HR (ref 0–20)
GLOBULIN UR ELPH-MCNC: 3.2 GM/DL
GLUCOSE SERPL-MCNC: 116 MG/DL (ref 65–99)
HCT VFR BLD AUTO: 39.3 % (ref 34–46.6)
HGB BLD-MCNC: 12.2 G/DL (ref 12–15.9)
LYMPHOCYTES # BLD MANUAL: 5.05 10*3/MM3 (ref 0.7–3.1)
LYMPHOCYTES NFR BLD MANUAL: 4.2 % (ref 5–12)
MCH RBC QN AUTO: 29.5 PG (ref 26.6–33)
MCHC RBC AUTO-ENTMCNC: 31 G/DL (ref 31.5–35.7)
MCV RBC AUTO: 95.2 FL (ref 79–97)
MONOCYTES # BLD: 0.73 10*3/MM3 (ref 0.1–0.9)
NEUTROPHILS # BLD AUTO: 11.21 10*3/MM3 (ref 1.7–7)
NEUTROPHILS NFR BLD MANUAL: 63.5 % (ref 42.7–76)
NEUTS BAND NFR BLD MANUAL: 1 % (ref 0–5)
NEUTS VAC BLD QL SMEAR: ABNORMAL
NRBC SPEC MANUAL: 1 /100 WBC (ref 0–0.2)
PLATELET # BLD AUTO: 494 10*3/MM3 (ref 140–450)
PMV BLD AUTO: 10.6 FL (ref 6–12)
POTASSIUM SERPL-SCNC: 4.2 MMOL/L (ref 3.5–5.2)
PROT SERPL-MCNC: 7.2 G/DL (ref 6–8.5)
RBC # BLD AUTO: 4.13 10*6/MM3 (ref 3.77–5.28)
ROULEAUX BLD QL SMEAR: ABNORMAL
SMALL PLATELETS BLD QL SMEAR: ABNORMAL
SODIUM SERPL-SCNC: 137 MMOL/L (ref 136–145)
T-UPTAKE NFR SERPL: 0.98 TBI (ref 0.8–1.3)
T4 SERPL-MCNC: 5.72 MCG/DL (ref 4.5–11.7)
TSH SERPL DL<=0.05 MIU/L-ACNC: 3.26 UIU/ML (ref 0.27–4.2)
VARIANT LYMPHS NFR BLD MANUAL: 26 % (ref 19.6–45.3)
VARIANT LYMPHS NFR BLD MANUAL: 3.1 % (ref 0–5)
WBC NRBC COR # BLD: 17.36 10*3/MM3 (ref 3.4–10.8)

## 2023-10-23 PROCEDURE — 86200 CCP ANTIBODY: CPT | Performed by: PHYSICIAN ASSISTANT

## 2023-10-23 PROCEDURE — 86235 NUCLEAR ANTIGEN ANTIBODY: CPT | Performed by: PHYSICIAN ASSISTANT

## 2023-10-23 PROCEDURE — 86225 DNA ANTIBODY NATIVE: CPT | Performed by: PHYSICIAN ASSISTANT

## 2023-10-23 PROCEDURE — 86431 RHEUMATOID FACTOR QUANT: CPT | Performed by: PHYSICIAN ASSISTANT

## 2023-10-23 PROCEDURE — 85652 RBC SED RATE AUTOMATED: CPT | Performed by: PHYSICIAN ASSISTANT

## 2023-10-23 PROCEDURE — 84479 ASSAY OF THYROID (T3 OR T4): CPT | Performed by: PHYSICIAN ASSISTANT

## 2023-10-23 PROCEDURE — 85007 BL SMEAR W/DIFF WBC COUNT: CPT | Performed by: PHYSICIAN ASSISTANT

## 2023-10-23 PROCEDURE — 36415 COLL VENOUS BLD VENIPUNCTURE: CPT | Performed by: PHYSICIAN ASSISTANT

## 2023-10-23 PROCEDURE — 86376 MICROSOMAL ANTIBODY EACH: CPT | Performed by: PHYSICIAN ASSISTANT

## 2023-10-23 PROCEDURE — 99214 OFFICE O/P EST MOD 30 MIN: CPT | Performed by: PHYSICIAN ASSISTANT

## 2023-10-23 PROCEDURE — 86800 THYROGLOBULIN ANTIBODY: CPT | Performed by: PHYSICIAN ASSISTANT

## 2023-10-23 PROCEDURE — 86038 ANTINUCLEAR ANTIBODIES: CPT | Performed by: PHYSICIAN ASSISTANT

## 2023-10-23 PROCEDURE — 84436 ASSAY OF TOTAL THYROXINE: CPT | Performed by: PHYSICIAN ASSISTANT

## 2023-10-23 PROCEDURE — 80050 GENERAL HEALTH PANEL: CPT | Performed by: PHYSICIAN ASSISTANT

## 2023-10-23 RX ORDER — BACLOFEN 10 MG/1
10 TABLET ORAL 2 TIMES DAILY
Qty: 60 TABLET | Refills: 1 | Status: SHIPPED | OUTPATIENT
Start: 2023-10-23

## 2023-10-23 NOTE — PROGRESS NOTES
"Subjective   Paradise English is a 48 y.o. female is here today for follow-up.    History of Present Illness     Paradise English is a 48-year-old female who presents today for follow-up. The patient is seen primarily for a mixed presentation, functional neurologic disorder. The patient is reporting no changes from previous today. Previously, she has been seen with issues of numbness and tingling, subjective ataxia. She has had an MRI scan with T2 weight changes. This was followed up with a spinal tap with spinal fluid analysis. This was in 2022. It showed no monoclonal bands in the spinal fluid that were warranted in the serum. She did have 6 repairs of monoclonal bands in the serum and CSF indicative of inflammatory process, but not indicative of multiple sclerosis. EMG nerve conduction study in the past has shown mild median neuropathy and some nonspecific abnormalities in the lower extremities that did not correlate clinically.    Bilateral leg pain and numbness  The patient reports that her bilateral legs are \"acting up again.\" She states that her legs were not hurting as badly as they normally do, but her feet were still numb. She thinks her legs are hurting from the spasms she is having. She feels the spasms in the back of her thighs. She was at work the other night and the nurse told her to prop her legs up on the couch. She states that she was sitting there and watching her legs spasm. She notes that the numbness in her feet is constantly numb on both sides. She states that occasionally it is in her left leg. She states that her walking has improved at work. She has not had any recent lab work. She reports that her muscles are sore to touch. She states that she has been walking on her tiptoes all day. She is tired all the time. She states that she has not seen NADEEM Lomeli in a while.    The following portions of the patient's history were reviewed and updated as appropriate: allergies, current " medications, past family history, past medical history, past social history, past surgical history and problem list.    Review of Systems:  A review of systems was performed, and positive findings are noted in the HPI.      Current Outpatient Medications:     amitriptyline (ELAVIL) 100 MG tablet, Take 2 tablets by mouth every night at bedtime., Disp: , Rfl: 2    amphetamine-dextroamphetamine (ADDERALL) 20 MG tablet, Take 1 tablet by mouth 3 (Three) Times a Day., Disp: , Rfl: 0    EPINEPHrine (EPIPEN) 0.3 MG/0.3ML solution auto-injector injection, Inject  under the skin into the appropriate area as directed 1 (One) Time., Disp: , Rfl:     montelukast (SINGULAIR) 10 MG tablet, Take 1 tablet by mouth Every Night., Disp: , Rfl:     ProAir  (90 Base) MCG/ACT inhaler, Inhale 1 puff Every 6 (Six) Hours As Needed., Disp: , Rfl:     rizatriptan (MAXALT) 10 MG tablet, Take 1 tablet by mouth 1 (One) Time As Needed., Disp: , Rfl:     Vraylar 4.5 MG capsule, Take 1 capsule by mouth Daily., Disp: , Rfl:     baclofen (LIORESAL) 10 MG tablet, Take 1 tablet by mouth 2 (Two) Times a Day., Disp: 60 tablet, Rfl: 1  No current facility-administered medications for this visit.     Objective   Physical Exam      Assessment & Plan   Diagnoses and all orders for this visit:    1. Functional neurological symptom disorder with mixed symptoms (Primary)    2. Numbness and tingling  -     CBC & Differential  -     Comprehensive Metabolic Panel  -     [facility] by IFA, Reflex 9-biomarkers profile  -     Thyroid Panel With TSH  -     Thyroid Antibodies  -     Sedimentation Rate  -     Rheumatoid Arthritis (RA) Profile  -     Manual Differential    3. Ataxia  -     CBC & Differential  -     Comprehensive Metabolic Panel  -     [facility] by IFA, Reflex 9-biomarkers profile  -     Thyroid Panel With TSH  -     Thyroid Antibodies  -     Sedimentation Rate  -     Rheumatoid Arthritis (RA) Profile  -     Manual Differential    4. Myalgia  -     CBC &  Differential  -     Comprehensive Metabolic Panel  -     [facility] by IFA, Reflex 9-biomarkers profile  -     Thyroid Panel With TSH  -     Thyroid Antibodies  -     Sedimentation Rate  -     Rheumatoid Arthritis (RA) Profile  -     baclofen (LIORESAL) 10 MG tablet; Take 1 tablet by mouth 2 (Two) Times a Day.  Dispense: 60 tablet; Refill: 1  -     Manual Differential      1. Functional neurologic disorder  - I still think many of her symptoms are functional at this point. She has some subjective numbness and tingling and uncertain abnormalities in her nerve studies. Given her myalgia pains and persistent neurotic type discomfort and subjective weakness, I will go ahead with some serologic studies to aim towards autoimmune type issues. We will try her on a course of low dose baclofen. See her back in follow-up as scheduled.               Transcribed from ambient dictation for EULALIO Culver by Sandra Cavazos.  10/23/23   11:15 CDT    Patient or patient representative verbalized consent to the visit recording.  I have personally performed the services described in this document as transcribed by the above individual, and it is both accurate and complete.

## 2023-10-24 LAB
CCP IGA+IGG SERPL IA-ACNC: 2 UNITS (ref 0–19)
RHEUMATOID FACT SERPL-ACNC: <10 IU/ML
THYROGLOB AB SERPL-ACNC: 1.5 IU/ML (ref 0–0.9)
THYROPEROXIDASE AB SERPL-ACNC: 24 IU/ML (ref 0–34)

## 2023-10-25 LAB
ANA HOMOGEN TITR SER: NORMAL {TITER}
ANA SER QL IF: POSITIVE
CENTROMERE B AB SER-ACNC: <0.2 AI (ref 0–0.9)
CHROMATIN AB SERPL-ACNC: <0.2 AI (ref 0–0.9)
DSDNA AB SER-ACNC: <1 IU/ML (ref 0–9)
ENA JO1 AB SER-ACNC: <0.2 AI (ref 0–0.9)
ENA RNP AB SER-ACNC: <0.2 AI (ref 0–0.9)
ENA SCL70 AB SER-ACNC: <0.2 AI (ref 0–0.9)
ENA SM AB SER-ACNC: <0.2 AI (ref 0–0.9)
ENA SS-A AB SER-ACNC: <0.2 AI (ref 0–0.9)
ENA SS-B AB SER-ACNC: <0.2 AI (ref 0–0.9)
LABORATORY COMMENT REPORT: ABNORMAL
Lab: NORMAL
Lab: NORMAL

## 2023-10-27 ENCOUNTER — TELEPHONE (OUTPATIENT)
Dept: NEUROLOGY | Facility: CLINIC | Age: 48
End: 2023-10-27
Payer: COMMERCIAL

## 2023-10-27 NOTE — TELEPHONE ENCOUNTER
Caller: CHAD    Relationship: SELF    Best call back number:   Telephone Information:   Mobile [phone]         Caller requesting test results: PT    What test was performed: Orders Placed      [facility] by IFA, Reflex 9-biomarkers profile    CBC & Differential    Comprehensive Metabolic Panel    Manual Differential    Sedimentation Rate    Thyroid Antibodies    MAYLIN+DNA/DS+Antich+Centro+FA..    Rheumatoid Arthritis (RA) Profile    Thyroid Panel With TSH    When was the test performed: 10-23-23    Where was the test performed:  [facility]    Additional notes: PLEASE CALL WITH RESULTS

## 2023-10-27 NOTE — TELEPHONE ENCOUNTER
Explained that Robert is out of the office today.  I will send him a message requesting the results.

## 2023-11-01 ENCOUNTER — PATIENT MESSAGE (OUTPATIENT)
Dept: NEUROLOGY | Facility: CLINIC | Age: 48
End: 2023-11-01
Payer: COMMERCIAL

## 2023-11-16 ENCOUNTER — TELEPHONE (OUTPATIENT)
Dept: NEUROLOGY | Facility: CLINIC | Age: 48
End: 2023-11-16
Payer: COMMERCIAL

## 2023-11-16 NOTE — TELEPHONE ENCOUNTER
----- Message from EULALIO Culver sent at 11/16/2023  1:27 PM CST -----  Labs to PCP - nothing on labs explains neuro sx, but labs need to be followed up with PCP for thyroid abnormality      ----- Message -----  From: Lisa Arreola LPN  Sent: 11/9/2023   9:52 AM CST  To: EULALIO Culver    Received a message from the hub, Paradise would like lab results.

## 2023-12-28 DIAGNOSIS — M79.10 MYALGIA: ICD-10-CM

## 2023-12-28 RX ORDER — BACLOFEN 10 MG/1
10 TABLET ORAL 2 TIMES DAILY
Qty: 60 TABLET | Refills: 3 | Status: SHIPPED | OUTPATIENT
Start: 2023-12-28

## 2024-01-04 ENCOUNTER — HOSPITAL ENCOUNTER (OUTPATIENT)
Dept: MRI IMAGING | Facility: HOSPITAL | Age: 49
Discharge: HOME | End: 2024-01-04
Admitting: PHYSICIAN ASSISTANT
Payer: COMMERCIAL

## 2024-01-04 DIAGNOSIS — R92.8 ABNORMAL MAMMOGRAM: ICD-10-CM

## 2024-01-04 LAB — CREAT BLDA-MCNC: 0.8 MG/DL (ref 0.6–1.3)

## 2024-01-04 PROCEDURE — 0 GADOBENATE DIMEGLUMINE 529 MG/ML SOLUTION: Performed by: PHYSICIAN ASSISTANT

## 2024-01-04 PROCEDURE — 77049 MRI BREAST C-+ W/CAD BI: CPT

## 2024-01-04 PROCEDURE — 82565 ASSAY OF CREATININE: CPT

## 2024-01-04 PROCEDURE — A9577 INJ MULTIHANCE: HCPCS | Performed by: PHYSICIAN ASSISTANT

## 2024-01-04 RX ADMIN — GADOBENATE DIMEGLUMINE 20 ML: 529 INJECTION, SOLUTION INTRAVENOUS at 10:11

## 2024-01-30 ENCOUNTER — TELEPHONE (OUTPATIENT)
Dept: HEMATOLOGY | Age: 49
End: 2024-01-30

## 2024-01-31 NOTE — PROGRESS NOTES
OP HEMATOLOGY/ONCOLOGY CONSULTATION      Pt Name: Faustina Marie  YOB: 1975  MRN: [id number]  Referring provider: Ki Kern PA-C   PCP: Rizwan Sullivan APRN - CNP      Date of evaluation: 2/1/2024   History Obtained From:  patient, electronic medical record    CHIEF COMPLAINT:    Chief Complaint   Patient presents with    New Patient     Leukocytosis, thrombocytosis.     HISTORY OF PRESENT ILLNESS:    Faustina Marie is a 48 y.o.  female referred from [facility] Rudd, for evaluation of leukocytosis.       Serology 12/19/2023  CBC-WBC 15.5, HGB 12.4, HCT 37.3, MCV 92, , Neutrophils 56 (absolute neutrophils 8.66), Lymph 30 (absolute lymph 4.68), Mono 10 (absolute mono 1.54), EOS 3 (absolute EOS 0.46), BASO 1(absolute BASO 0.100).    CMP-WNL          Faustina reports that she had her spleen removed in 2002 at [facility] because it was \"enlarged, they thought there was cancer in it\".  She reports pathology was negative.    She reports that she has known that she has had elevated white count and platelets since 2017.    She does take Adderall for ADHD.  She has seasonal allergies which is stable on Zyrtec.  She has headaches periodically on Imitrex reports they are better.  She has a history of smoking about a quarter of a pack a day but currently she only smokes about 1 cigarette at night.      Past Medical History:   Diagnosis Date    ADHD (attention deficit hyperactivity disorder)     Anxiety     Bipolar affective disorder (HCC)     GERD (gastroesophageal reflux disease)        Past Surgical History:   Procedure Laterality Date    CHOLECYSTECTOMY      HERNIA REPAIR      mid abdomen    SPLENECTOMY      TONSILLECTOMY      TUBAL LIGATION      US BREAST BIOPSY W LOC DEVICE 1ST LESION RIGHT Right 05/10/2023    US BREAST BIOPSY W LOC DEVICE 1ST LESION RIGHT 5/10/2023 [facility] [place]         Current Outpatient Medications:     imipramine (TOFRANIL) 50 MG tablet, Take 1 tablet by

## 2024-02-01 ENCOUNTER — OFFICE VISIT (OUTPATIENT)
Dept: HEMATOLOGY | Age: 49
End: 2024-02-01
Payer: COMMERCIAL

## 2024-02-01 ENCOUNTER — HOSPITAL ENCOUNTER (OUTPATIENT)
Dept: INFUSION THERAPY | Age: 49
Discharge: HOME | End: 2024-02-01
Payer: COMMERCIAL

## 2024-02-01 VITALS
SYSTOLIC BLOOD PRESSURE: 142 MMHG | OXYGEN SATURATION: 94 % | TEMPERATURE: 98.6 F | WEIGHT: 257.5 LBS | HEART RATE: 86 BPM | BODY MASS INDEX: 39.03 KG/M2 | DIASTOLIC BLOOD PRESSURE: 86 MMHG | HEIGHT: 68 IN

## 2024-02-01 DIAGNOSIS — D72.829 LEUKOCYTOSIS, UNSPECIFIED TYPE: ICD-10-CM

## 2024-02-01 DIAGNOSIS — D72.829 LEUKOCYTOSIS, UNSPECIFIED TYPE: Primary | ICD-10-CM

## 2024-02-01 DIAGNOSIS — D75.839 THROMBOCYTOSIS: ICD-10-CM

## 2024-02-01 DIAGNOSIS — Z90.81 H/O SPLENECTOMY: ICD-10-CM

## 2024-02-01 LAB
BASOPHILS # BLD: 0.09 K/UL (ref 0.01–0.08)
BASOPHILS NFR BLD: 0.6 % (ref 0.1–1.2)
CRP SERPL HS-MCNC: 0.9 MG/DL (ref 0–0.5)
EOSINOPHIL # BLD: 0.39 K/UL (ref 0.04–0.54)
EOSINOPHIL NFR BLD: 2.6 % (ref 0.7–7)
ERYTHROCYTE [DISTWIDTH] IN BLOOD BY AUTOMATED COUNT: 15 % (ref 11.7–14.4)
FERRITIN SERPL-MCNC: 38.5 NG/ML (ref 13–150)
HCT VFR BLD AUTO: 40.2 % (ref 34.1–44.9)
HGB BLD-MCNC: 13.3 G/DL (ref 11.2–15.7)
IRON SATN MFR SERPL: 22 % (ref 14–50)
IRON SERPL-MCNC: 70 UG/DL (ref 37–145)
LYMPHOCYTES # BLD: 5.12 K/UL (ref 1.18–3.74)
LYMPHOCYTES NFR BLD: 34.3 % (ref 19.3–53.1)
MCH RBC QN AUTO: 30.4 PG (ref 25.6–32.2)
MCHC RBC AUTO-ENTMCNC: 33.1 G/DL (ref 32.3–35.5)
MCV RBC AUTO: 92 FL (ref 79.4–94.8)
MONOCYTES # BLD: 1.27 K/UL (ref 0.24–0.82)
MONOCYTES NFR BLD: 8.5 % (ref 4.7–12.5)
NEUTROPHILS # BLD: 8 K/UL (ref 1.56–6.13)
NEUTS SEG NFR BLD: 53.7 % (ref 34–71.1)
PLATELET # BLD AUTO: 414 K/UL (ref 182–369)
PMV BLD AUTO: 10.2 FL (ref 7.4–10.4)
RBC # BLD AUTO: 4.37 M/UL (ref 3.93–5.22)
TIBC SERPL-MCNC: 320 UG/DL (ref 250–400)
WBC # BLD AUTO: 14.91 K/UL (ref 3.98–10.04)

## 2024-02-01 PROCEDURE — 85025 COMPLETE CBC W/AUTO DIFF WBC: CPT

## 2024-02-01 PROCEDURE — 99212 OFFICE O/P EST SF 10 MIN: CPT

## 2024-02-01 PROCEDURE — 99204 OFFICE O/P NEW MOD 45 MIN: CPT | Performed by: PHYSICIAN ASSISTANT

## 2024-02-01 PROCEDURE — 36415 COLL VENOUS BLD VENIPUNCTURE: CPT

## 2024-02-01 RX ORDER — CETIRIZINE HYDROCHLORIDE 10 MG/1
10 TABLET ORAL DAILY
COMMUNITY

## 2024-02-01 RX ORDER — IMIPRAMINE HCL 50 MG
50 TABLET ORAL 2 TIMES DAILY
COMMUNITY
Start: 2018-07-23

## 2024-02-01 ASSESSMENT — ENCOUNTER SYMPTOMS
EYE ITCHING: 0
SHORTNESS OF BREATH: 0
CONSTIPATION: 0
COUGH: 0
SORE THROAT: 0
ABDOMINAL PAIN: 0
VOMITING: 0
ABDOMINAL DISTENTION: 0
COLOR CHANGE: 0
DIARRHEA: 0
TROUBLE SWALLOWING: 0
PHOTOPHOBIA: 0
NAUSEA: 0
BACK PAIN: 1
BLOOD IN STOOL: 0
WHEEZING: 0
VOICE CHANGE: 0
EYE DISCHARGE: 0

## 2024-02-01 ASSESSMENT — PROMIS GLOBAL HEALTH SCALE
SUM OF RESPONSES TO QUESTIONS 3, 6, 7, & 8: 9
IN GENERAL, HOW WOULD YOU RATE YOUR MENTAL HEALTH, INCLUDING YOUR MOOD AND YOUR ABILITY TO THINK [ON A SCALE OF 1 (POOR) TO 5 (EXCELLENT)]?: 3
IN GENERAL, PLEASE RATE HOW WELL YOU CARRY OUT YOUR USUAL SOCIAL ACTIVITIES (INCLUDES ACTIVITIES AT HOME, AT WORK, AND IN YOUR COMMUNITY, AND RESPONSIBILITIES AS A PARENT, CHILD, SPOUSE, EMPLOYEE, FRIEND, ETC) [ON A SCALE OF 1 (POOR) TO 5 (EXCELLENT)]?: 3
IN GENERAL, WOULD YOU SAY YOUR QUALITY OF LIFE IS...[ON A SCALE OF 1 (POOR) TO 5 (EXCELLENT)]: 3
IN GENERAL, HOW WOULD YOU RATE YOUR SATISFACTION WITH YOUR SOCIAL ACTIVITIES AND RELATIONSHIPS [ON A SCALE OF 1 (POOR) TO 5 (EXCELLENT)]?: 3
IN GENERAL, WOULD YOU SAY YOUR HEALTH IS...[ON A SCALE OF 1 (POOR) TO 5 (EXCELLENT)]: 2
TO WHAT EXTENT ARE YOU ABLE TO CARRY OUT YOUR EVERYDAY PHYSICAL ACTIVITIES SUCH AS WALKING, CLIMBING STAIRS, CARRYING GROCERIES, OR MOVING A CHAIR [ON A SCALE OF 1 (NOT AT ALL) TO 5 (COMPLETELY)]?: 5
IN THE PAST 7 DAYS, HOW WOULD YOU RATE YOUR PAIN ON AVERAGE [ON A SCALE FROM 0 (NO PAIN) TO 10 (WORST IMAGINABLE PAIN)]?: 0
IN THE PAST 7 DAYS, HOW WOULD YOU RATE YOUR FATIGUE ON AVERAGE [ON A SCALE FROM 1 (NONE) TO 5 (VERY SEVERE)]?: 1
IN THE PAST 7 DAYS, HOW OFTEN HAVE YOU BEEN BOTHERED BY EMOTIONAL PROBLEMS, SUCH AS FEELING ANXIOUS, DEPRESSED, OR IRRITABLE [ON A SCALE FROM 1 (NEVER) TO 5 (ALWAYS)]?: 3
SUM OF RESPONSES TO QUESTIONS 2, 4, 5, & 10: 12
IN GENERAL, HOW WOULD YOU RATE YOUR PHYSICAL HEALTH [ON A SCALE OF 1 (POOR) TO 5 (EXCELLENT)]?: 3

## 2024-02-09 LAB
CALR EXON 9 MUT ANL BLD/T: NORMAL
CITATION REF LAB TEST: NORMAL
JAK2 GENE MUT ANL BLD/T: NORMAL
JAK2 P.V617F BLD/T QL: NORMAL
LAB DIRECTOR NAME PROVIDER: NORMAL
MPL GENE MUT TESTED MAR: NORMAL
REF LAB TEST METHOD: NORMAL
REFLEX: NORMAL
TEST PERFORMANCE INFO SPEC: NORMAL

## 2024-04-16 ENCOUNTER — TELEPHONE (OUTPATIENT)
Dept: HEMATOLOGY | Age: 49
End: 2024-04-16

## 2024-04-17 NOTE — PROGRESS NOTES
Review of Systems   Constitutional:  Negative for chills, diaphoresis, fatigue, fever and unexpected weight change.   HENT:  Negative for mouth sores, nosebleeds, sore throat, trouble swallowing and voice change.    Eyes:  Negative for photophobia, discharge and itching.   Respiratory:  Negative for cough, shortness of breath and wheezing.    Cardiovascular:  Negative for chest pain, palpitations and leg swelling.   Gastrointestinal:  Negative for abdominal distention, abdominal pain, blood in stool, constipation, diarrhea, nausea and vomiting.   Endocrine: Negative for cold intolerance, heat intolerance, polydipsia and polyuria.   Genitourinary:  Negative for difficulty urinating, dysuria, hematuria and urgency.   Musculoskeletal:  Positive for back pain. Negative for arthralgias, joint swelling and myalgias.   Skin:  Negative for color change and rash.   Neurological:  Negative for dizziness, tremors, seizures, syncope and light-headedness.   Hematological:  Negative for adenopathy. Bruises/bleeds easily (Bruises easily).   Psychiatric/Behavioral:  Negative for behavioral problems and suicidal ideas. The patient is nervous/anxious.    All other systems reviewed and are negative.      Objective   /88   Pulse 86   Temp 97.8 °F (36.6 °C)   Ht 1.727 m (5' 8\")   Wt 120.2 kg (265 lb)   SpO2 97%   BMI 40.29 kg/m²     PHYSICAL EXAM:  Physical Exam  Constitutional:       Appearance: She is well-developed and overweight.   HENT:      Head: Normocephalic and atraumatic.   Eyes:      General: No scleral icterus.     Conjunctiva/sclera: Conjunctivae normal.   Neck:      Trachea: No tracheal deviation.   Cardiovascular:      Rate and Rhythm: Normal rate and regular rhythm.      Heart sounds: Normal heart sounds. No murmur heard.  Pulmonary:      Effort: Pulmonary effort is normal. No respiratory distress.      Breath sounds: Normal breath sounds.   Abdominal:      General: Bowel sounds are normal. There is no

## 2024-04-18 ENCOUNTER — OFFICE VISIT (OUTPATIENT)
Dept: HEMATOLOGY | Age: 49
End: 2024-04-18
Payer: COMMERCIAL

## 2024-04-18 ENCOUNTER — HOSPITAL ENCOUNTER (OUTPATIENT)
Dept: INFUSION THERAPY | Age: 49
Discharge: HOME | End: 2024-04-18
Payer: COMMERCIAL

## 2024-04-18 VITALS
HEART RATE: 86 BPM | WEIGHT: 265 LBS | BODY MASS INDEX: 40.16 KG/M2 | DIASTOLIC BLOOD PRESSURE: 88 MMHG | OXYGEN SATURATION: 97 % | HEIGHT: 68 IN | SYSTOLIC BLOOD PRESSURE: 126 MMHG | TEMPERATURE: 97.8 F

## 2024-04-18 DIAGNOSIS — D75.839 THROMBOCYTOSIS: ICD-10-CM

## 2024-04-18 DIAGNOSIS — D72.829 LEUKOCYTOSIS, UNSPECIFIED TYPE: ICD-10-CM

## 2024-04-18 DIAGNOSIS — D72.829 LEUKOCYTOSIS, UNSPECIFIED TYPE: Primary | ICD-10-CM

## 2024-04-18 DIAGNOSIS — Z90.81 H/O SPLENECTOMY: ICD-10-CM

## 2024-04-18 LAB
BASOPHILS # BLD: 0.07 K/UL (ref 0.01–0.08)
BASOPHILS NFR BLD: 0.4 % (ref 0.1–1.2)
EOSINOPHIL # BLD: 0.29 K/UL (ref 0.04–0.54)
EOSINOPHIL NFR BLD: 1.7 % (ref 0.7–7)
ERYTHROCYTE [DISTWIDTH] IN BLOOD BY AUTOMATED COUNT: 14.5 % (ref 11.7–14.4)
HCT VFR BLD AUTO: 38.8 % (ref 34.1–44.9)
HGB BLD-MCNC: 12.9 G/DL (ref 11.2–15.7)
LYMPHOCYTES # BLD: 5.19 K/UL (ref 1.18–3.74)
LYMPHOCYTES NFR BLD: 30.5 % (ref 19.3–53.1)
MCH RBC QN AUTO: 31.2 PG (ref 25.6–32.2)
MCHC RBC AUTO-ENTMCNC: 33.2 G/DL (ref 32.3–35.5)
MCV RBC AUTO: 93.7 FL (ref 79.4–94.8)
MONOCYTES # BLD: 1.34 K/UL (ref 0.24–0.82)
MONOCYTES NFR BLD: 7.9 % (ref 4.7–12.5)
NEUTROPHILS # BLD: 10.05 K/UL (ref 1.56–6.13)
NEUTS SEG NFR BLD: 59.1 % (ref 34–71.1)
PLATELET # BLD AUTO: 382 K/UL (ref 182–369)
PMV BLD AUTO: 10.4 FL (ref 7.4–10.4)
RBC # BLD AUTO: 4.14 M/UL (ref 3.93–5.22)
WBC # BLD AUTO: 17.01 K/UL (ref 3.98–10.04)

## 2024-04-18 PROCEDURE — 99213 OFFICE O/P EST LOW 20 MIN: CPT | Performed by: PHYSICIAN ASSISTANT

## 2024-04-18 PROCEDURE — 36415 COLL VENOUS BLD VENIPUNCTURE: CPT

## 2024-04-18 PROCEDURE — 99212 OFFICE O/P EST SF 10 MIN: CPT

## 2024-04-18 PROCEDURE — 85025 COMPLETE CBC W/AUTO DIFF WBC: CPT

## 2024-04-18 ASSESSMENT — ENCOUNTER SYMPTOMS
SHORTNESS OF BREATH: 0
EYE DISCHARGE: 0
COLOR CHANGE: 0
BACK PAIN: 1
PHOTOPHOBIA: 0
CONSTIPATION: 0
EYE ITCHING: 0
NAUSEA: 0
VOICE CHANGE: 0
SORE THROAT: 0
TROUBLE SWALLOWING: 0
VOMITING: 0
DIARRHEA: 0
BLOOD IN STOOL: 0
ABDOMINAL DISTENTION: 0
ABDOMINAL PAIN: 0
WHEEZING: 0
COUGH: 0

## 2024-05-22 ENCOUNTER — OFFICE VISIT (OUTPATIENT)
Dept: SURGERY | Facility: CLINIC | Age: 49
End: 2024-05-22
Payer: COMMERCIAL

## 2024-05-22 VITALS
OXYGEN SATURATION: 96 % | DIASTOLIC BLOOD PRESSURE: 94 MMHG | BODY MASS INDEX: 40.47 KG/M2 | HEART RATE: 94 BPM | HEIGHT: 68 IN | SYSTOLIC BLOOD PRESSURE: 144 MMHG | WEIGHT: 267 LBS

## 2024-05-22 DIAGNOSIS — E66.01 CLASS 3 SEVERE OBESITY DUE TO EXCESS CALORIES WITH BODY MASS INDEX (BMI) OF 40.0 TO 44.9 IN ADULT, UNSPECIFIED WHETHER SERIOUS COMORBIDITY PRESENT: ICD-10-CM

## 2024-05-22 DIAGNOSIS — K43.2 RECURRENT INCISIONAL HERNIA: Primary | ICD-10-CM

## 2024-05-22 RX ORDER — CLONIDINE HYDROCHLORIDE 0.1 MG/1
0.1 TABLET ORAL AS NEEDED
COMMUNITY
Start: 2024-03-15

## 2024-05-22 NOTE — PROGRESS NOTES
Office New Patient History and Physical:     Referring Provider: Antwon Ram APRN    Chief Complaint   Patient presents with    Hernia       Subjective .     History of present illness:  Paradise English is a 48 y.o. female who presents with abdominal wall recurrent hernias. She has a history of an open incisional hernia repair with mesh in 2002 and 2003. She had had a prior splenectomy and the initial hernia was from that incision. A few years ago she started noticing pain when she coughs or lifts. No nausea nor vomiting.     BMI is 41. She is not on blood thinners. She is a current every other day vape user with nicotine. PSH includes cholecystectomy, splenectomy, hernia repair, and tubal ligation.     History  Past Medical History:   Diagnosis Date    ADD (attention deficit disorder)     Anxiety     Bipolar 1 disorder     CTS (carpal tunnel syndrome) 04/19    Diabetes     Difficulty walking 05/22    Low back pain     Migraine 01/12    Peripheral neuropathy     Shingles 06/19   ,   Past Surgical History:   Procedure Laterality Date    HERNIA REPAIR      LAPAROSCOPIC TUBAL LIGATION      SPLENECTOMY      TONSILLECTOMY     ,   Family History   Problem Relation Age of Onset    Stroke Mother     Diabetes Mother     Stroke Maternal Grandmother     Diabetes Maternal Grandmother     Dementia Maternal Grandmother     Cancer Father     Diabetes Maternal Grandfather    ,   Social History     Tobacco Use    Smoking status: Some Days     Current packs/day: 0.50     Average packs/day: 0.5 packs/day for 15.0 years (7.5 ttl pk-yrs)     Types: Cigarettes   Vaping Use    Vaping status: Never Used   Substance Use Topics    Alcohol use: Not Currently    Drug use: No   , (Not in a hospital admission)   and Allergies:  Penicillins, Tramadol, Mobic [meloxicam], Neurontin [gabapentin], Toradol [ketorolac tromethamine], Coconut, and Tizanidine    Current Outpatient Medications:     amitriptyline (ELAVIL) 100 MG tablet, Take 2 tablets by  "mouth every night at bedtime., Disp: , Rfl: 2    amphetamine-dextroamphetamine (ADDERALL) 20 MG tablet, Take 1 tablet by mouth 3 (Three) Times a Day., Disp: , Rfl: 0    cloNIDine (CATAPRES) 0.1 MG tablet, Take 1 tablet by mouth As Needed for High Blood Pressure., Disp: , Rfl:     EPINEPHrine (EPIPEN) 0.3 MG/0.3ML solution auto-injector injection, Inject  under the skin into the appropriate area as directed 1 (One) Time., Disp: , Rfl:     metFORMIN (GLUCOPHAGE) 500 MG tablet, Take 1 tablet by mouth Daily With Breakfast., Disp: , Rfl:     montelukast (SINGULAIR) 10 MG tablet, Take 1 tablet by mouth Every Night., Disp: , Rfl:     ProAir  (90 Base) MCG/ACT inhaler, Inhale 1 puff Every 6 (Six) Hours As Needed., Disp: , Rfl:     rizatriptan (MAXALT) 10 MG tablet, Take 1 tablet by mouth 1 (One) Time As Needed., Disp: , Rfl:       Objective     Vital Signs   /94   Pulse 94   Ht 171.5 cm (67.5\")   Wt 121 kg (267 lb)   SpO2 96%   BMI 41.20 kg/m²      Physical Exam:  General appearance - alert, well appearing, and in no distress  Mental status - alert, oriented to person, place, and time  Eyes - pupils equal and reactive, extraocular eye movements intact  Neck - supple, no significant adenopathy  Chest - no tachypnea, retractions or cyanosis  Heart - normal rate and regular rhythm  Abdomen - soft, non-distended, non-tender, obese, multiple incisional hernias palpable along incision but difficult to palpate the defect 2/2 body habitus   Neurological - alert, oriented, normal speech, no focal findings or movement disorder noted    Results Review:     The following data was reviewed by: Delmis Fermin MD on 05/22/2024:    REFERRAL/PRE-AUTH MRN - SCAN - REF FROM [facility] (04/24/2024)   US: No acute process. Prior hernia repair.   CT without contrast: small fatty ventral hernias     Assessment & Plan       Diagnoses and all orders for this visit:    1. Recurrent incisional hernia (Primary)  -     " Cancel: Ambulatory Referral to Bariatric Surgery    2. Class 3 severe obesity due to excess calories with body mass index (BMI) of 40.0 to 44.9 in adult, unspecified whether serious comorbidity present  -     Ambulatory Referral to Bariatric Surgery       Ms. English is a 48 year old female with recurrent incisional hernias s/p repair with mesh 20 years ago. I will have my office get the disc with her CT images from [place].  Highly encouraged vape cessation prior to hernia repair. Also recommend weight loss prior to hernia repair. Will refer her to the weight management clinic downstairs. Follow up in 3 months to assess success with weight loss and review the CT images.     This is a chronic problem with progression (present for months, getting larger). I have reviewed the CT, US and note results above. I have placed a referral.         Delmis Fermin MD  05/22/24  15:01 CDT

## 2024-05-22 NOTE — PATIENT INSTRUCTIONS

## 2024-06-12 ENCOUNTER — TELEPHONE (OUTPATIENT)
Dept: BARIATRICS/WEIGHT MGMT | Facility: CLINIC | Age: 49
End: 2024-06-12
Payer: COMMERCIAL

## 2024-06-12 NOTE — TELEPHONE ENCOUNTER
LVM about their new pt appt and to bring ppw. Call with any questions [phone]. Also this 1st appt may last up 2hrs

## 2024-09-22 ENCOUNTER — TELEMEDICINE (OUTPATIENT)
Dept: FAMILY MEDICINE CLINIC | Facility: TELEHEALTH | Age: 49
End: 2024-09-22
Payer: COMMERCIAL

## 2024-09-22 DIAGNOSIS — M79.629 PAIN IN AXILLA, UNSPECIFIED LATERALITY: Primary | ICD-10-CM

## 2024-09-22 RX ORDER — CETIRIZINE HYDROCHLORIDE 10 MG/1
1 TABLET ORAL DAILY
COMMUNITY

## 2024-10-11 ENCOUNTER — TELEPHONE (OUTPATIENT)
Dept: HEMATOLOGY | Age: 49
End: 2024-10-11

## 2024-12-31 ENCOUNTER — TELEPHONE (OUTPATIENT)
Dept: HEMATOLOGY | Age: 49
End: 2024-12-31

## 2024-12-31 NOTE — TELEPHONE ENCOUNTER
I called patient and left detailed voicemail about their appointment on 01/03/2025. I made patient aware not to arrive any earlier than the appointment time and to come at the time of the follow up not the time of the lab appointment if it is different than the follow up appt time. I also made patient aware to eat and drink plenty of water to hydrate properly before coming to these appointments because this will make their lab draw much easier. Made patient aware that we are now located at the [facility] at [address] [place] [address]. Located between [facility] and the [facility].

## 2025-01-02 DIAGNOSIS — D75.839 THROMBOCYTOSIS: ICD-10-CM

## 2025-01-02 DIAGNOSIS — D72.829 LEUKOCYTOSIS, UNSPECIFIED TYPE: Primary | ICD-10-CM

## 2025-01-03 ENCOUNTER — TRANSCRIBE ORDERS (OUTPATIENT)
Dept: ADMINISTRATIVE | Facility: HOSPITAL | Age: 50
End: 2025-01-03
Payer: COMMERCIAL

## 2025-01-03 DIAGNOSIS — R07.9 CHEST PAIN, UNSPECIFIED TYPE: Primary | ICD-10-CM

## 2025-01-07 ENCOUNTER — TELEPHONE (OUTPATIENT)
Dept: INTERVENTIONAL RADIOLOGY/VASCULAR | Facility: HOSPITAL | Age: 50
End: 2025-01-07
Payer: COMMERCIAL

## 2025-01-07 RX ORDER — NITROGLYCERIN 0.4 MG/1
0.4 TABLET SUBLINGUAL
OUTPATIENT
Start: 2025-01-07 | End: 2025-01-07

## 2025-01-07 RX ORDER — METOPROLOL TARTRATE 25 MG/1
25 TABLET, FILM COATED ORAL ONCE
OUTPATIENT
Start: 2025-01-07

## 2025-01-07 RX ORDER — METOPROLOL TARTRATE 50 MG
50 TABLET ORAL ONCE
OUTPATIENT
Start: 2025-01-07

## 2025-01-07 RX ORDER — METOPROLOL TARTRATE 100 MG/1
100 TABLET ORAL ONCE
OUTPATIENT
Start: 2025-01-07

## 2025-01-07 RX ORDER — METOPROLOL TARTRATE 50 MG
50 TABLET ORAL
OUTPATIENT
Start: 2025-01-07

## 2025-01-07 RX ORDER — NITROGLYCERIN 0.4 MG/1
0.8 TABLET SUBLINGUAL
OUTPATIENT
Start: 2025-01-07

## 2025-01-07 RX ORDER — METOPROLOL TARTRATE 1 MG/ML
5 INJECTION, SOLUTION INTRAVENOUS
OUTPATIENT
Start: 2025-01-07

## 2025-01-07 RX ORDER — METOPROLOL TARTRATE 100 MG/1
200 TABLET ORAL ONCE
OUTPATIENT
Start: 2025-01-07 | End: 2025-01-07

## 2025-01-08 ENCOUNTER — HOSPITAL ENCOUNTER (OUTPATIENT)
Dept: CT IMAGING | Facility: HOSPITAL | Age: 50
Discharge: HOME | End: 2025-01-08
Payer: COMMERCIAL

## 2025-02-06 ENCOUNTER — TELEPHONE (OUTPATIENT)
Dept: HEMATOLOGY | Age: 50
End: 2025-02-06

## 2025-02-06 NOTE — TELEPHONE ENCOUNTER
Called pt. to remind them of appointment on 02/11/2025 and had to leave a detailed voicemail with appointment date and time. Reminded patient to just come at appointment time, and to not come at the lab appointment time. Reminded patient that we will not check them in any more than 30 minutes before appointment time. We have now moved to the [facility] that is located between our old office and the ER at the [facility]. Letting the Pt know that our front entrance faces the [employer] fields and leaving our address. Reminded pt to eat well and be well hydrated for their labs.

## 2025-02-10 DIAGNOSIS — D75.839 THROMBOCYTOSIS: ICD-10-CM

## 2025-02-10 DIAGNOSIS — D72.829 LEUKOCYTOSIS, UNSPECIFIED TYPE: Primary | ICD-10-CM

## 2025-02-11 ENCOUNTER — HOSPITAL ENCOUNTER (OUTPATIENT)
Dept: INFUSION THERAPY | Age: 50
Discharge: HOME | End: 2025-02-11
Payer: COMMERCIAL

## 2025-02-11 ENCOUNTER — OFFICE VISIT (OUTPATIENT)
Dept: HEMATOLOGY | Age: 50
End: 2025-02-11
Payer: COMMERCIAL

## 2025-02-11 VITALS
BODY MASS INDEX: 40.15 KG/M2 | DIASTOLIC BLOOD PRESSURE: 72 MMHG | SYSTOLIC BLOOD PRESSURE: 124 MMHG | HEART RATE: 88 BPM | OXYGEN SATURATION: 98 % | TEMPERATURE: 97.9 F | WEIGHT: 264.9 LBS | HEIGHT: 68 IN

## 2025-02-11 DIAGNOSIS — D72.829 LEUKOCYTOSIS, UNSPECIFIED TYPE: ICD-10-CM

## 2025-02-11 DIAGNOSIS — D75.839 THROMBOCYTOSIS: ICD-10-CM

## 2025-02-11 DIAGNOSIS — F17.210 CIGARETTE NICOTINE DEPENDENCE WITHOUT COMPLICATION: ICD-10-CM

## 2025-02-11 DIAGNOSIS — D72.829 LEUKOCYTOSIS, UNSPECIFIED TYPE: Primary | ICD-10-CM

## 2025-02-11 DIAGNOSIS — Z90.81 H/O SPLENECTOMY: ICD-10-CM

## 2025-02-11 LAB
BASOPHILS # BLD: 0.08 K/UL (ref 0–0.2)
BASOPHILS NFR BLD: 0.4 % (ref 0–1)
EOSINOPHIL # BLD: 0.52 K/UL (ref 0–0.6)
EOSINOPHIL NFR BLD: 2.8 % (ref 0–5)
ERYTHROCYTE [DISTWIDTH] IN BLOOD BY AUTOMATED COUNT: 14.4 % (ref 11.5–14.5)
HCT VFR BLD AUTO: 37.7 % (ref 37–47)
HGB BLD-MCNC: 12.5 G/DL (ref 12–16)
LYMPHOCYTES # BLD: 5.59 K/UL (ref 1.1–4.5)
LYMPHOCYTES NFR BLD: 29.8 % (ref 20–40)
MCH RBC QN AUTO: 31.7 PG (ref 27–31)
MCHC RBC AUTO-ENTMCNC: 33.2 G/DL (ref 33–37)
MCV RBC AUTO: 95.7 FL (ref 81–99)
MONOCYTES # BLD: 1.31 K/UL (ref 0–0.9)
MONOCYTES NFR BLD: 7 % (ref 1–10)
NEUTROPHILS # BLD: 11.19 K/UL (ref 1.5–7.5)
NEUTS SEG NFR BLD: 59.7 % (ref 50–65)
PLATELET # BLD AUTO: 446 K/UL (ref 130–400)
PMV BLD AUTO: 10.8 FL (ref 9.4–12.3)
RBC # BLD AUTO: 3.94 M/UL (ref 4.2–5.4)
WBC # BLD AUTO: 18.75 K/UL (ref 4.8–10.8)

## 2025-02-11 PROCEDURE — 99213 OFFICE O/P EST LOW 20 MIN: CPT | Performed by: PHYSICIAN ASSISTANT

## 2025-02-11 PROCEDURE — 99211 OFF/OP EST MAY X REQ PHY/QHP: CPT

## 2025-02-11 PROCEDURE — 36415 COLL VENOUS BLD VENIPUNCTURE: CPT

## 2025-02-11 PROCEDURE — 85025 COMPLETE CBC W/AUTO DIFF WBC: CPT

## 2025-02-11 ASSESSMENT — ENCOUNTER SYMPTOMS
VOMITING: 0
DIARRHEA: 0
ABDOMINAL PAIN: 0
PHOTOPHOBIA: 0
EYE DISCHARGE: 0
NAUSEA: 0
CONSTIPATION: 0
COUGH: 0
WHEEZING: 0
TROUBLE SWALLOWING: 0
BACK PAIN: 1
ABDOMINAL DISTENTION: 0
VOICE CHANGE: 0
SORE THROAT: 0
EYE ITCHING: 0
COLOR CHANGE: 0
SHORTNESS OF BREATH: 0
BLOOD IN STOOL: 0

## 2025-06-26 ENCOUNTER — TRANSCRIBE ORDERS (OUTPATIENT)
Dept: ADMINISTRATIVE | Facility: HOSPITAL | Age: 50
End: 2025-06-26
Payer: COMMERCIAL

## 2025-06-26 DIAGNOSIS — N64.52 NIPPLE DISCHARGE: Primary | ICD-10-CM

## 2025-06-26 DIAGNOSIS — N64.4 BREAST PAIN: ICD-10-CM

## 2025-07-05 LAB
NCCN CRITERIA FLAG: ABNORMAL
TYRER CUZICK SCORE: 18.8

## 2025-07-07 ENCOUNTER — RESULTS FOLLOW-UP (OUTPATIENT)
Facility: HOSPITAL | Age: 50
End: 2025-07-07
Payer: COMMERCIAL

## 2025-07-07 NOTE — PROGRESS NOTES
I briefly spoke with the patient, she requested a return call at a later time to discuss risk assessment results.

## 2025-07-08 NOTE — PROGRESS NOTES
I left a voice mail and sent a [employer] message requesting a return call to discuss risk assessment results.

## 2025-07-10 ENCOUNTER — HOSPITAL ENCOUNTER (OUTPATIENT)
Dept: MAMMOGRAPHY | Facility: HOSPITAL | Age: 50
Discharge: HOME | End: 2025-07-10
Payer: COMMERCIAL

## 2025-07-10 ENCOUNTER — HOSPITAL ENCOUNTER (OUTPATIENT)
Dept: ULTRASOUND IMAGING | Facility: HOSPITAL | Age: 50
Discharge: HOME | End: 2025-07-10
Payer: COMMERCIAL

## 2025-07-10 DIAGNOSIS — N64.52 NIPPLE DISCHARGE: ICD-10-CM

## 2025-07-10 DIAGNOSIS — N64.4 BREAST PAIN: ICD-10-CM

## 2025-07-10 LAB — CREAT BLDA-MCNC: 0.7 MG/DL (ref 0.6–1.3)

## 2025-07-10 PROCEDURE — 82565 ASSAY OF CREATININE: CPT

## 2025-07-10 PROCEDURE — 77066 DX MAMMO INCL CAD BI: CPT

## 2025-07-10 PROCEDURE — 76642 ULTRASOUND BREAST LIMITED: CPT

## 2025-07-10 PROCEDURE — 25510000001 IOPAMIDOL PER 1 ML: Performed by: NURSE PRACTITIONER

## 2025-07-10 PROCEDURE — G0279 TOMOSYNTHESIS, MAMMO: HCPCS

## 2025-07-10 RX ORDER — IOPAMIDOL 755 MG/ML
50 INJECTION, SOLUTION INTRAVASCULAR
Status: COMPLETED | OUTPATIENT
Start: 2025-07-10 | End: 2025-07-10

## 2025-07-10 RX ORDER — IOPAMIDOL 755 MG/ML
100 INJECTION, SOLUTION INTRAVASCULAR
Status: COMPLETED | OUTPATIENT
Start: 2025-07-10 | End: 2025-07-10

## 2025-07-10 RX ADMIN — IOPAMIDOL 50 ML: 755 INJECTION, SOLUTION INTRAVENOUS at 09:43

## 2025-07-10 RX ADMIN — IOPAMIDOL 100 ML: 755 INJECTION, SOLUTION INTRAVENOUS at 09:43

## 2025-07-15 ENCOUNTER — TRANSCRIBE ORDERS (OUTPATIENT)
Dept: ADMINISTRATIVE | Facility: HOSPITAL | Age: 50
End: 2025-07-15
Payer: COMMERCIAL

## 2025-07-15 DIAGNOSIS — N64.52 NIPPLE DISCHARGE: ICD-10-CM

## 2025-07-15 DIAGNOSIS — Z80.3 FAMILY HISTORY OF BREAST CANCER: Primary | ICD-10-CM

## 2025-07-15 DIAGNOSIS — N64.4 PAIN IN BREAST: ICD-10-CM

## 2025-07-15 DIAGNOSIS — R92.8 ABNORMAL MAMMOGRAM: Primary | ICD-10-CM

## 2025-07-17 ENCOUNTER — LAB (OUTPATIENT)
Dept: LAB | Facility: HOSPITAL | Age: 50
End: 2025-07-17
Payer: COMMERCIAL

## 2025-07-17 ENCOUNTER — TRANSCRIBE ORDERS (OUTPATIENT)
Dept: ADMINISTRATIVE | Facility: HOSPITAL | Age: 50
End: 2025-07-17
Payer: COMMERCIAL

## 2025-07-17 DIAGNOSIS — R92.8 ABNORMAL MAMMOGRAM: Primary | ICD-10-CM

## 2025-07-17 DIAGNOSIS — N64.4 BREAST PAIN: ICD-10-CM

## 2025-07-17 DIAGNOSIS — Z80.3 FAMILY HISTORY OF BREAST CANCER: ICD-10-CM

## 2025-07-17 DIAGNOSIS — N64.52 NIPPLE DISCHARGE: ICD-10-CM

## 2025-07-17 PROCEDURE — 36415 COLL VENOUS BLD VENIPUNCTURE: CPT

## 2025-07-28 ENCOUNTER — TRANSCRIBE ORDERS (OUTPATIENT)
Dept: ADMINISTRATIVE | Facility: HOSPITAL | Age: 50
End: 2025-07-28
Payer: COMMERCIAL

## 2025-07-28 DIAGNOSIS — N64.4 BREAST PAIN: ICD-10-CM

## 2025-07-28 DIAGNOSIS — R92.8 ABNORMAL MAMMOGRAM: Primary | ICD-10-CM

## 2025-08-01 LAB
AMBRY INTERPRETATION REPORT: NORMAL
GENE DIS ANL INTERP-IMP: NORMAL

## 2025-08-26 ENCOUNTER — HOSPITAL ENCOUNTER (OUTPATIENT)
Dept: MRI IMAGING | Facility: HOSPITAL | Age: 50
Discharge: HOME | End: 2025-08-26
Admitting: NURSE PRACTITIONER
Payer: COMMERCIAL

## 2025-08-26 DIAGNOSIS — N64.4 BREAST PAIN: ICD-10-CM

## 2025-08-26 DIAGNOSIS — R92.8 ABNORMAL MAMMOGRAM: ICD-10-CM

## 2025-08-26 DIAGNOSIS — N64.52 NIPPLE DISCHARGE: ICD-10-CM

## 2025-08-26 LAB — CREAT BLDA-MCNC: 0.8 MG/DL (ref 0.6–1.3)

## 2025-08-26 PROCEDURE — 25510000001 GADOPICLENOL 0.5 MMOL/ML SOLUTION: Performed by: NURSE PRACTITIONER

## 2025-08-26 PROCEDURE — 77049 MRI BREAST C-+ W/CAD BI: CPT

## 2025-08-26 PROCEDURE — A9573 GADOPICLENOL 0.5 MMOL/ML SOLUTION: HCPCS | Performed by: NURSE PRACTITIONER

## 2025-08-26 PROCEDURE — 82565 ASSAY OF CREATININE: CPT

## 2025-08-26 PROCEDURE — 76376 3D RENDER W/INTRP POSTPROCES: CPT

## 2025-08-26 RX ADMIN — GADOPICLENOL 10 ML: 485.1 INJECTION INTRAVENOUS at 13:20
